# Patient Record
Sex: MALE | Race: WHITE | NOT HISPANIC OR LATINO | URBAN - METROPOLITAN AREA
[De-identification: names, ages, dates, MRNs, and addresses within clinical notes are randomized per-mention and may not be internally consistent; named-entity substitution may affect disease eponyms.]

---

## 2022-01-05 ENCOUNTER — LAB (OUTPATIENT)
Dept: LAB | Facility: HOSPITAL | Age: 62
End: 2022-01-05

## 2022-01-05 ENCOUNTER — TRANSCRIBE ORDERS (OUTPATIENT)
Dept: LAB | Facility: HOSPITAL | Age: 62
End: 2022-01-05

## 2022-01-05 DIAGNOSIS — Z01.818 PRE-OP TESTING: Primary | ICD-10-CM

## 2022-01-05 DIAGNOSIS — Z01.818 PRE-OP TESTING: ICD-10-CM

## 2022-01-05 PROCEDURE — U0004 COV-19 TEST NON-CDC HGH THRU: HCPCS

## 2022-01-05 PROCEDURE — C9803 HOPD COVID-19 SPEC COLLECT: HCPCS

## 2022-01-06 LAB — SARS-COV-2 RNA PNL SPEC NAA+PROBE: NOT DETECTED

## 2023-03-29 ENCOUNTER — TELEPHONE (OUTPATIENT)
Dept: FAMILY MEDICINE CLINIC | Facility: CLINIC | Age: 63
End: 2023-03-29
Payer: OTHER GOVERNMENT

## 2023-03-29 NOTE — TELEPHONE ENCOUNTER
Called patient's wife and notified her that [facility] is no longer accepting new patients.  Patient is already scheduled to see Nishi Albright on 4/26/23 to establish care.

## 2023-03-29 NOTE — TELEPHONE ENCOUNTER
Caller: JAYLENE PIPER    Relationship to patient: Spouse    Best call back number: [phone]    Chief complaint: SECOND OPINION ON LUNG CT    Type of visit: NEW PATIENT    Requested date: ASAP     Additional notes: PATIENT'S WIFE SEES AARON PRECIADO AND WOULD LIKE TO SET UP AN APPOINTMENT. THEY WERE ADVISED THAT PATIENT HAS A LUMP ON  ONE OF HIS LUNGS AND WOULD LIKE A SECOND OPINION.

## 2023-04-26 ENCOUNTER — LAB (OUTPATIENT)
Dept: LAB | Facility: HOSPITAL | Age: 63
End: 2023-04-26
Payer: OTHER GOVERNMENT

## 2023-04-26 ENCOUNTER — OFFICE VISIT (OUTPATIENT)
Dept: FAMILY MEDICINE CLINIC | Facility: CLINIC | Age: 63
End: 2023-04-26
Payer: OTHER GOVERNMENT

## 2023-04-26 VITALS
DIASTOLIC BLOOD PRESSURE: 71 MMHG | OXYGEN SATURATION: 100 % | HEART RATE: 46 BPM | SYSTOLIC BLOOD PRESSURE: 130 MMHG | BODY MASS INDEX: 29.23 KG/M2 | WEIGHT: 204.2 LBS | HEIGHT: 70 IN

## 2023-04-26 DIAGNOSIS — Z77.090 H/O ASBESTOS EXPOSURE: ICD-10-CM

## 2023-04-26 DIAGNOSIS — I25.10 CORONARY ARTERY CALCIFICATION: ICD-10-CM

## 2023-04-26 DIAGNOSIS — I25.84 CORONARY ARTERY CALCIFICATION: ICD-10-CM

## 2023-04-26 DIAGNOSIS — Z87.891 HISTORY OF CIGARETTE SMOKING: ICD-10-CM

## 2023-04-26 DIAGNOSIS — E04.9 THYROID ENLARGEMENT: ICD-10-CM

## 2023-04-26 DIAGNOSIS — E04.9 THYROID ENLARGEMENT: Primary | ICD-10-CM

## 2023-04-26 DIAGNOSIS — R91.1 LUNG NODULE: ICD-10-CM

## 2023-04-26 LAB
T4 FREE SERPL-MCNC: 1.23 NG/DL (ref 0.93–1.7)
TSH SERPL DL<=0.05 MIU/L-ACNC: 0.56 UIU/ML (ref 0.27–4.2)

## 2023-04-26 PROCEDURE — 99204 OFFICE O/P NEW MOD 45 MIN: CPT | Performed by: NURSE PRACTITIONER

## 2023-04-26 PROCEDURE — 36415 COLL VENOUS BLD VENIPUNCTURE: CPT

## 2023-04-26 PROCEDURE — 84439 ASSAY OF FREE THYROXINE: CPT

## 2023-04-26 PROCEDURE — 86376 MICROSOMAL ANTIBODY EACH: CPT

## 2023-04-26 PROCEDURE — 86800 THYROGLOBULIN ANTIBODY: CPT

## 2023-04-26 PROCEDURE — 84443 ASSAY THYROID STIM HORMONE: CPT

## 2023-04-26 RX ORDER — ROSUVASTATIN CALCIUM 20 MG/1
20 TABLET, COATED ORAL DAILY
COMMUNITY

## 2023-04-26 RX ORDER — CELECOXIB 200 MG/1
200 CAPSULE ORAL DAILY
COMMUNITY

## 2023-04-26 RX ORDER — MULTIPLE VITAMINS W/ MINERALS TAB 9MG-400MCG
1 TAB ORAL DAILY
COMMUNITY

## 2023-04-26 RX ORDER — DOCUSATE SODIUM 250 MG
250 CAPSULE ORAL DAILY
COMMUNITY

## 2023-04-26 RX ORDER — FINASTERIDE 5 MG/1
5 TABLET, FILM COATED ORAL DAILY
COMMUNITY

## 2023-04-26 RX ORDER — TAMSULOSIN HYDROCHLORIDE 0.4 MG/1
1 CAPSULE ORAL DAILY
COMMUNITY

## 2023-04-26 NOTE — PROGRESS NOTES
"Chief Complaint  Establish Care and Lung spot (Patient has spot on lung, seen on CT scan, wants second opinion )    SUBJECTIVE  Jay uMñiz presents to [facility]   Patient here today requesting a second opinion on his lung nodule.  Patient had a x-ray of his shoulder for shoulder pain in January 2021 which noted a \"nodular opacity in the right lower lobe best seen on lateral view\" and recommended further eval with CT of the chest.  They report that this was not reported to them and no follow-up was done, they found this incidentally while looking through his records recently due to history of the asbestos exposure, his provider ordered a CT with IV contrast, however they report that when they went for the imaging radiology change it to CT of chest without contrast.  CT noted a few punctate calcified granulomas, a 2 mm right middle lobe nodule, likely a calcified granuloma per report.  Other notable findings included mild coronary artery calcifications as well as thyroid lobes appearing enlarged without discrete nodule.      Pt has hx asbestos exposure [id number] in [employer]. Pt also smoked about 1&1/2ppd about 30 or more years, has been smoke-free for 17 years.     Patient and his wife are very concerned about the fact that the CT was done without contrast and they are wanting to seek a second opinion.  Patient does not have any symptoms such as chest pain, shortness of breath, cough, unintentional weight loss.    Patient reports he has lost about 40 pounds but this was very much intentional through significant diet and exercise    History of Present Illness  Past Medical History:   Diagnosis Date   • Arthritis 2006   • Carpal tunnel syndrome     both hands      Family History   Problem Relation Age of Onset   • Arthritis Mother    • Asthma Mother    • Arthritis Father    • Cancer Father    • Diabetes Father       Past Surgical History:   Procedure Laterality " "Date   • EYE SURGERY  Sept/Oct 2020   • ROTATOR CUFF REPAIR Left    • SHOULDER SURGERY Right         Current Outpatient Medications:   •  ascorbic acid (VITAMIN C) 250 MG tablet, Take 1 tablet by mouth Daily., Disp: , Rfl:   •  celecoxib (CeleBREX) 200 MG capsule, Take 1 capsule by mouth Daily., Disp: , Rfl:   •  cholecalciferol (VITAMIN D3) 25 MCG (1000 UT) tablet, Take 1 tablet by mouth Daily., Disp: , Rfl:   •  docusate sodium (COLACE) 250 MG capsule, Take 1 capsule by mouth Daily., Disp: , Rfl:   •  finasteride (PROSCAR) 5 MG tablet, Take 1 tablet by mouth Daily., Disp: , Rfl:   •  folic acid-vit B6-vit B12 (FOLGARD) 2.2-25-1 MG tablet tablet, Take  by mouth., Disp: , Rfl:   •  multivitamin with minerals tablet tablet, Take 1 tablet by mouth Daily., Disp: , Rfl:   •  rosuvastatin (CRESTOR) 20 MG tablet, Take 1 tablet by mouth Daily., Disp: , Rfl:   •  tamsulosin (FLOMAX) 0.4 MG capsule 24 hr capsule, Take 1 capsule by mouth Daily., Disp: , Rfl:   •  Biotin 300 MCG tablet, Take  by mouth. (Patient not taking: Reported on 4/26/2023), Disp: , Rfl:     OBJECTIVE  Vital Signs:   /71 (BP Location: Right arm, Patient Position: Sitting, Cuff Size: Adult)   Pulse (!) 46   Ht 177.8 cm (70\")   Wt 92.6 kg (204 lb 3.2 oz)   SpO2 100%   BMI 29.30 kg/m²    Estimated body mass index is 29.3 kg/m² as calculated from the following:    Height as of this encounter: 177.8 cm (70\").    Weight as of this encounter: 92.6 kg (204 lb 3.2 oz).     Wt Readings from Last 3 Encounters:   04/26/23 92.6 kg (204 lb 3.2 oz)     BP Readings from Last 3 Encounters:   04/26/23 130/71       Physical Exam  Vitals reviewed.   Constitutional:       Appearance: Normal appearance. He is well-developed.   HENT:      Head: Normocephalic and atraumatic.      Right Ear: External ear normal.      Left Ear: External ear normal.   Eyes:      Conjunctiva/sclera: Conjunctivae normal.      Pupils: Pupils are equal, round, and reactive to light.   Neck: "      Comments: Thyroid feels somewhat enlarged   Cardiovascular:      Rate and Rhythm: Normal rate and regular rhythm.      Heart sounds: No murmur heard.    No friction rub. No gallop.   Pulmonary:      Effort: Pulmonary effort is normal.      Breath sounds: Normal breath sounds. No wheezing or rhonchi.   Skin:     General: Skin is warm and dry.   Neurological:      Mental Status: He is alert and oriented to person, place, and time.      Cranial Nerves: No cranial nerve deficit.   Psychiatric:         Mood and Affect: Mood and affect normal.         Behavior: Behavior normal.         Thought Content: Thought content normal.         Judgment: Judgment normal.          Result Review        No Images in the past 120 days found..     The above data has been reviewed by AARON Niño 04/26/2023 10:55 EDT.          Patient Care Team:  Nishi Albright APRN as PCP - General (Nurse Practitioner)    BMI cannot be calculated due to outdated height or weight values.  Please input a current height/weight in Vitals and re-renter BMIFOLLOWUP in Note to pull in correct documentation based on BMI range.       ASSESSMENT & PLAN    Diagnoses and all orders for this visit:    1. Thyroid enlargement (Primary)  -     TSH+Free T4; Future  -     US Head Neck Soft Tissue; Future  -     Thyroid Antibodies; Future    2. H/O asbestos exposure  -     Ambulatory Referral to Pulmonology    3. History of cigarette smoking  -     Ambulatory Referral to Pulmonology    4. Lung nodule  -     Ambulatory Referral to Pulmonology    5. Coronary artery calcification  Assessment & Plan:  CT of chest noted to be positive for mild coronary artery calcifications.  Discussed this with patient and his wife at length, discussed possible neck steps for evaluation would include referral to cardiology, discussed possibility of stress test, discussed possibility of CT calcium scoring.  Patient declines any and all further follow-up at this time, denies  any chest pain or shortness of breath, will let us know if he changes his mind         Tobacco Use: Medium Risk   • Smoking Tobacco Use: Former   • Smokeless Tobacco Use: Never   • Passive Exposure: Not on file       Follow Up     Return if symptoms worsen or fail to improve.        Patient was given instructions and counseling regarding his condition or for health maintenance advice. Please see specific information pulled into the AVS if appropriate.   I have reviewed information obtained and documented by others and I have confirmed the accuracy of this documented note.    AARON Niño

## 2023-04-26 NOTE — ASSESSMENT & PLAN NOTE
CT of chest noted to be positive for mild coronary artery calcifications.  Discussed this with patient and his wife at length, discussed possible neck steps for evaluation would include referral to cardiology, discussed possibility of stress test, discussed possibility of CT calcium scoring.  Patient declines any and all further follow-up at this time, denies any chest pain or shortness of breath, will let us know if he changes his mind

## 2023-04-28 LAB
THYROGLOB AB SERPL-ACNC: <1 IU/ML (ref 0–0.9)
THYROPEROXIDASE AB SERPL-ACNC: 16 IU/ML (ref 0–34)

## 2023-05-31 ENCOUNTER — HOSPITAL ENCOUNTER (OUTPATIENT)
Dept: ULTRASOUND IMAGING | Facility: HOSPITAL | Age: 63
Discharge: HOME | End: 2023-05-31
Admitting: NURSE PRACTITIONER

## 2023-05-31 DIAGNOSIS — E04.9 THYROID ENLARGEMENT: ICD-10-CM

## 2023-05-31 PROCEDURE — 76536 US EXAM OF HEAD AND NECK: CPT

## 2023-06-01 DIAGNOSIS — E04.1 THYROID NODULE: Primary | ICD-10-CM

## 2023-06-12 ENCOUNTER — OFFICE VISIT (OUTPATIENT)
Dept: PULMONOLOGY | Facility: CLINIC | Age: 63
End: 2023-06-12
Payer: OTHER GOVERNMENT

## 2023-06-12 VITALS
RESPIRATION RATE: 18 BRPM | OXYGEN SATURATION: 99 % | DIASTOLIC BLOOD PRESSURE: 63 MMHG | HEART RATE: 46 BPM | WEIGHT: 202 LBS | TEMPERATURE: 97.8 F | SYSTOLIC BLOOD PRESSURE: 122 MMHG | HEIGHT: 70 IN | BODY MASS INDEX: 28.92 KG/M2

## 2023-06-12 DIAGNOSIS — R93.89 ABNORMAL CHEST CT: ICD-10-CM

## 2023-06-12 DIAGNOSIS — Z23 NEED FOR VACCINATION WITH 20-POLYVALENT PNEUMOCOCCAL CONJUGATE VACCINE: Primary | ICD-10-CM

## 2023-06-12 DIAGNOSIS — R91.1 LUNG NODULE: ICD-10-CM

## 2023-06-12 PROCEDURE — 99204 OFFICE O/P NEW MOD 45 MIN: CPT | Performed by: STUDENT IN AN ORGANIZED HEALTH CARE EDUCATION/TRAINING PROGRAM

## 2023-06-12 PROCEDURE — 90471 IMMUNIZATION ADMIN: CPT | Performed by: STUDENT IN AN ORGANIZED HEALTH CARE EDUCATION/TRAINING PROGRAM

## 2023-06-12 PROCEDURE — 90677 PCV20 VACCINE IM: CPT | Performed by: STUDENT IN AN ORGANIZED HEALTH CARE EDUCATION/TRAINING PROGRAM

## 2023-06-12 NOTE — PROGRESS NOTES
Primary Care Provider  Nishi Albright APRN   Referring Provider  AARON Bustamante      Patient Complaint  2mm RML nodule and Establish Care      Subjective          Jay Muñiz presents to [facility]      History of Presenting Illness  Jay Muñiz is a 62 y.o. male here as a new patient for abnormal chest CT.    [facility].  Patient brought the CD with him today however, I am unable to see the CT and lung window.  There is also no report attached in the CD. Patient denies respiratory symptoms at this time including cough, fever, chills, hemoptysis, weight loss, night sweats.  He was previously a heavy smoker, 1.5 pack/day for years and quit in 2006.  He reports previous asbestos exposure back when he was in the [employer] [id number].  He does not use inhalers at this time.  Of note he had COVID twice in the last 3 years.  No other exposures including dust, chemicals, animals.  I have personally reviewed patient's past family, social, medical and surgical histories and agree with their findings.    Review of Systems  Constitutional:  No fever. No chills. No weakness.  Eyes: No pain, erythema, or discharge. No blurring of vision.  ENT:  No sore throat, URI symptoms.   Cardiovascular:  No chest pain. No palpitations. No lower extremity edema.  Respiratory:  No shortness of breath, cough, pleuritic chest pain. No hemoptysis. No dyspnea.   GI:  Normal appetite. No nausea, vomiting, diarrhea. No pain. No melena.  Musculoskeletal:  No arthralgias or myalgias.  Neurologic:  No headache. No weakness.    Family History   Problem Relation Age of Onset    Arthritis Mother     Asthma Mother     Arthritis Father     Cancer Father     Diabetes Father         Social History     Socioeconomic History    Marital status:    Tobacco Use    Smoking status: Former     Packs/day: 1.50     Years: 30.00     Pack  "years: 45.00     Types: Cigarettes     Quit date: 2006     Years since quittin.6    Smokeless tobacco: Never   Vaping Use    Vaping Use: Never used   Substance and Sexual Activity    Alcohol use: Not Currently     Comment: Social Drinker, haven't drank in years    Drug use: Never    Sexual activity: Not Currently     Partners: Female        Past Medical History:   Diagnosis Date    Arthritis 2006    Carpal tunnel syndrome     both hands    Lung nodule 2021    Sleep apnea, obstructive         Immunization History   Administered Date(s) Administered    COVID-19 (Outski) 2021, 10/29/2021    FluLaval/Fluzone >6mos 10/18/2022    Flublok 18+yrs 10/23/2019, 10/20/2020    Shingrix 2022, 2023       No Known Allergies       Current Outpatient Medications:     ascorbic acid (VITAMIN C) 250 MG tablet, Take 1 tablet by mouth Daily., Disp: , Rfl:     Biotin 300 MCG tablet, Take  by mouth., Disp: , Rfl:     celecoxib (CeleBREX) 200 MG capsule, Take 1 capsule by mouth Daily., Disp: , Rfl:     cholecalciferol (VITAMIN D3) 25 MCG (1000 UT) tablet, Take 1 tablet by mouth Daily., Disp: , Rfl:     docusate sodium (COLACE) 250 MG capsule, Take 1 capsule by mouth Daily., Disp: , Rfl:     finasteride (PROSCAR) 5 MG tablet, Take 1 tablet by mouth Daily., Disp: , Rfl:     folic acid-vit B6-vit B12 (FOLGARD) 2.2-25-1 MG tablet tablet, Take  by mouth., Disp: , Rfl:     multivitamin with minerals tablet tablet, Take 1 tablet by mouth Daily., Disp: , Rfl:     rosuvastatin (CRESTOR) 20 MG tablet, Take 1 tablet by mouth Daily., Disp: , Rfl:     tamsulosin (FLOMAX) 0.4 MG capsule 24 hr capsule, Take 1 capsule by mouth Daily., Disp: , Rfl:      Objective     Vital Signs:   /63 (BP Location: Right arm, Patient Position: Sitting, Cuff Size: Adult)   Pulse (!) 46   Temp 97.8 °F (36.6 °C) (Temporal)   Resp 18   Ht 177.8 cm (70\")   Wt 91.6 kg (202 lb)   SpO2 99% Comment: Room air  BMI 28.98 kg/m²     Physical " Exam  Vitals:    06/12/23 1106   BP: 122/63   Pulse: (!) 46   Resp: 18   Temp: 97.8 °F (36.6 °C)   SpO2: 99%       General: Alert, NAD  HEENT:  EOMI, no sinus tenderness  Neck:  Supple, no thyromegaly,  no JVD  Lymph: no cervical, supraclavicular lymphadenopathy bilaterally  Chest:  clear to auscultation bilaterally, no wheezing or crackles; no work of breathing noted  CV: RRR, no M/G/R, pulses 2+, equal.  Abd:  Soft, NT, ND, +BS  EXT:  no clubbing, no cyanosis, no edema b/l  Neuro:  A&Ox3, CN grossly intact, no focal deficits  Skin: No rashes or lesions noted       Result Review :   I have personally reviewed patient's labs and images.          Assessment and Plan      Patient Active Problem List   Diagnosis    Coronary artery calcification       Impression and Plan:    Lung nodule  Abnormal chest CT  History of COVID x2  Previous tobacco user, quit 2006 (1.5 pack/day previously)    -2 mm [facility].  I am unable to view this image.  I informed him and his wife to bring the report to me for documentation purposes.  Patient is asymptomatic from a respiratory standpoint.  Previous tobacco user, quit 2006.    -Will repeat chest CT 05/2024 (ordered); follow-up thereafter    Vaccination status: Patient is up-to-date with his COVID and influenza vaccinations. Will receive his pneumococcal vaccination today    Medications personally reviewed.    Follow Up   No follow-ups on file.  Patient was given instructions and counseling regarding his condition or for health maintenance advice. Please see specific information pulled into the AVS if appropriate.

## 2023-08-14 ENCOUNTER — HOSPITAL ENCOUNTER (OUTPATIENT)
Dept: MRI IMAGING | Facility: HOSPITAL | Age: 63
Discharge: HOME | End: 2023-08-14
Admitting: ORTHOPAEDIC SURGERY
Payer: OTHER GOVERNMENT

## 2023-08-14 DIAGNOSIS — M17.11 PRIMARY OSTEOARTHRITIS OF RIGHT KNEE: ICD-10-CM

## 2023-08-14 DIAGNOSIS — S83.241A ACUTE MEDIAL MENISCUS TEAR OF RIGHT KNEE, INITIAL ENCOUNTER: ICD-10-CM

## 2023-08-14 PROCEDURE — 73721 MRI JNT OF LWR EXTRE W/O DYE: CPT

## 2023-08-18 ENCOUNTER — OFFICE VISIT (OUTPATIENT)
Dept: ORTHOPEDIC SURGERY | Facility: CLINIC | Age: 63
End: 2023-08-18
Payer: OTHER GOVERNMENT

## 2023-08-18 ENCOUNTER — PREP FOR SURGERY (OUTPATIENT)
Dept: OTHER | Facility: HOSPITAL | Age: 63
End: 2023-08-18
Payer: OTHER GOVERNMENT

## 2023-08-18 VITALS — HEIGHT: 70 IN | HEART RATE: 88 BPM | WEIGHT: 197 LBS | BODY MASS INDEX: 28.2 KG/M2 | OXYGEN SATURATION: 98 %

## 2023-08-18 DIAGNOSIS — M17.11 PRIMARY OSTEOARTHRITIS OF RIGHT KNEE: Primary | ICD-10-CM

## 2023-08-18 DIAGNOSIS — S83.241A ACUTE MEDIAL MENISCUS TEAR OF RIGHT KNEE, INITIAL ENCOUNTER: ICD-10-CM

## 2023-08-18 RX ORDER — CEFAZOLIN SODIUM 2 G/100ML
2 INJECTION, SOLUTION INTRAVENOUS ONCE
OUTPATIENT
Start: 2023-08-18 | End: 2023-08-18

## 2023-08-18 NOTE — PROGRESS NOTES
"Chief Complaint  Follow-up of the Right Knee     Subjective      Jay Muñiz presents to [facility] for follow-up of the right knee pain, follow-up of right knee MRI. He reports right knee pain since  from . He uses diclofenac gel with relief. He has tried Celebrex without relief. He takes OTC Aleve with some relief. He locates pain to the medial knee.  He reports continued pain and symptoms of the knee. No new complaints.     No Known Allergies     Social History     Socioeconomic History    Marital status:    Tobacco Use    Smoking status: Former     Packs/day: 1.50     Years: 30.00     Pack years: 45.00     Types: Cigarettes     Quit date: 2006     Years since quittin.7    Smokeless tobacco: Never   Vaping Use    Vaping Use: Never used   Substance and Sexual Activity    Alcohol use: Not Currently     Comment: Social Drinker, haven't drank in years    Drug use: Never    Sexual activity: Not Currently     Partners: Female        I reviewed the patient's chief complaint, history of present illness, review of systems, past medical history, surgical history, family history, social history, medications, and allergy list.     Review of Systems     Constitutional: Denies fevers, chills, weight loss  Cardiovascular: Denies chest pain, shortness of breath  Skin: Denies rashes, acute skin changes  Neurologic: Denies headache, loss of consciousness  MSK: right knee      Vital Signs:   Pulse 88   Ht 177.8 cm (70\")   Wt 89.4 kg (197 lb)   SpO2 98%   BMI 28.27 kg/mý          Physical Exam  General: Alert. No acute distress    Ortho Exam         Right knee- tender to the medial joint line. Tender to the medial distal femur. ROM +3 to 125 degrees. Stable to varus/valgus stress. Stable to anterior/posterior drawer. Well healed anterior knee scar. Negative Lachman's. Pain with Cosmo's. Positive EHL, FHL, GS and TA. Sensation intact to all 5 nerves of the foot. " Positive pulses.     Procedures        Imaging Results (Most Recent)       None             Result Review :       MRI Knee Right Without Contrast    Result Date: 8/14/2023  Narrative: PROCEDURE: MRI KNEE RIGHT  WO CONTRAST  COMPARISON: None  INDICATIONS: MEDIAL RIGHT KNEE PAIN WITH OFF/ON SWELLING. NO KNOWN INJURY.      TECHNIQUE: A complete multi-planar MRI was performed.   FINDINGS:  The cruciate ligaments, collateral ligaments, and extensor mechanism of the knee are intact.  There is a complex tear involving the posterior horn and body segment of the medial meniscus.  There is a horizontal component which extends through the inferior articular surface of the body segment and posterior horn.  There is a slightly shifted fragment which extends into the inferior margin of the medial gutter along the undersurface of the body segment measuring approximately 1 cm.  There is also involvement of the root of the posterior horn.  A small parameniscal cyst is seen along the posterior margin of the root measuring approximately 1 cm.  There is an additional parameniscal cyst which extends from the posterior medial margin of the meniscus into the soft tissues along the posterior medial aspect of the medial femoral condyle superiorly.  This finding measures up to 3 cm.  There is no evidence for lateral meniscal tear.  Mild tricompartmental osteoarthritic degenerative changes are identified with evidence for articular cartilage irregularity/fissuring, subchondral edema/cystic change, and osteophytosis.  There is mild full-thickness articular cartilage loss within the medial compartment.  No significant joint effusion is observed. No significant focal abnormal bone marrow signal is identified. The cortical margins are intact. No significant abnormal focal fluid collection is observed within the surrounding soft tissues.      Impression:   1. There is a complex meniscal tear involving the posterior horn and body segment of the  medial meniscus.  There is evidence for a small flipped or displaced fragment which extends into the inferior margin of the medial gutter along the undersurface of the body segment measuring 1 cm.  There is also evidence for 2 separate associated parameniscal cysts arising from the posterior margin of the meniscus. 2. Mild tricompartmental osteoarthritis.  Mild full-thickness articular cartilage loss is noted in the medial compartment.       ALISA TRUJILLO MD       Electronically Signed and Approved By: ALISA TRUJILLO MD on 8/14/2023 at 13:26                     Assessment and Plan     Diagnoses and all orders for this visit:    1. Primary osteoarthritis of right knee (Primary)    2. Acute medial meniscus tear of right knee        Discussed the MRI with the patient and discussed the way his knee has possibly progressed overtime to cause the tear. We discussed the options with the patient. He tried a steroid injection with relief for a brief time. He has also tried anti-inflammatories. We discussed operative treatment with patient and he expressed understanding. He wished to proceed with surgery and will choose a time in November.     Discussed surgery., Risks/benefits discussed with patient including, but not limited to: infection, bleeding, neurovascular damage, re-rupture, aesthetic deformity, need for further surgery, and death., Risks/benefits discussed with patient including, but not limited to: infection, bleeding, neurovascular damage, malunion, nonunion, aesthetic deformity, need for further surgery, and death., Discussed with patient the implant type being used during surgery and patient understands., Surgery pamphlet given., Call or return if worsening symptoms., DME order for a 3 in 1 given today due to patient will be confined to one room/level of the home that does not offer a toilet during postop recovery. , I am requesting authorization for the samr System to reduce the patient's pain and aid in their  recovery. I believe the samr Sport System will have positive impact on my patient's quality of life. I would appreciate prompt review of the information and authorization of the samr System.  samr Sport utilizes ultrasonic waves that penetrate 5 cm into the tissue, which increases circulation, oxygen and nutrient delivery, and the removal of waste products, such as lactic acid, from the site of the musculoskeletal injury.  The samr System is a new FDA approved (FDA 510K 163625) treatment modality that that has been shown in recent clinical trials sponsored by the NIH (National Institutes of Health), the DOD (Department of Defense) and [facility] the research arm of the [employer] ([employer]ics [employer]) to reduce patient's pain, increase mobility and speed recovery. , and I am ordering the use of the Nice1 cold therapy machine for 60 days post-op as part of an opioid-sparing approach to help manage pain and edema.  I feel this is medically necessary for the best care for this patient.       Follow Up     2 weeks postoperative      Patient was given instructions and counseling regarding his condition or for health maintenance advice. Please see specific information pulled into the AVS if appropriate.     Scribed for Washington Bishop MD by Angela Shen.  08/18/23   08:18 EDT    I have personally performed the services described in this document as scribed by the above individual and it is both accurate and complete. Washington Bishop MD 08/20/23

## 2023-11-12 NOTE — H&P
[facility]   HISTORY AND PHYSICAL    Patient Name: Jay Muñiz  : 1960  MRN: [id number]  Primary Care Physician:  Nishi Albright APRN  Date of admission: (Not on file)    Subjective   Subjective     Chief Complaint: Right knee pain    History of Present Illness: The patient is a 62-year-old male with right knee pain.  The patient has symptoms worsening despite attempted nonoperative treatment.  There is pain and disability to the knee that is worse with activity.  The patient has failed nonoperative treatment and wishes to undergo arthroscopy.    Review of Systems : Negative except for those mentioned in the history of present illness    Personal History     Past Medical History:   Diagnosis Date    Arthritis     JOEY KNEE    Carpal tunnel syndrome     both hands    Lung nodule 2021    Sleep apnea, obstructive     LOST WEIGHT NO LONGER USES MACHINE       Past Surgical History:   Procedure Laterality Date    CARPAL TUNNEL RELEASE      ELBOW PROCEDURE      EYE SURGERY  Sept/Oct 2020    ROTATOR CUFF REPAIR Left     SHOULDER SURGERY Right        Family History: family history includes Arthritis in his father and mother; Asthma in his mother; Cancer in his father; Diabetes in his father. Otherwise pertinent FHx was reviewed and not pertinent to current issue.    Social History:  reports that he quit smoking about 17 years ago. His smoking use included cigarettes. He has a 45.00 pack-year smoking history. He has never used smokeless tobacco. He reports that he does not currently use alcohol. He reports that he does not use drugs.    Home Medications:  Biotin, Diclofenac Sodium, Turmeric, ascorbic acid, cholecalciferol, finasteride, folic acid-vit B6-vit B12, multivitamin with minerals, rosuvastatin, and tamsulosin    Allergies:  No Known Allergies    Objective    Objective     Vitals:        Physical Exam  General: No apparent distress, alert and oriented x3  HEENT:  Normocephalic/atraumatic  Neck: Supple  Cardiovascular: Regular heart rate  Chest: Unlabored breathing  Abdomen: Soft, nontender and nondistended  Musculoskeletal: Neurovascular intact extremity.  Positive pulses.  Tender to palpation medial joint line.  Positive Cosmo.  Stability intact.  Tender to palpation the knee.  Neurological: Grossly intact    Result Review    Result Review:  I have personally reviewed the results from the time of this admission to 11/12/2023 16:31 EST and agree with these findings:  []  Laboratory list / accordion  []  Microbiology  [x]  Radiology  []  EKG/Telemetry   []  Cardiology/Vascular   []  Pathology  []  Old records  []  Other:  Most notable findings include: MRI reveals medial meniscus tear      Assessment & Plan   Assessment / Plan     Brief Patient Summary:  Jay Muñiz is a 62 y.o. male who has right knee medial meniscus tear    Active Hospital Problems:  Active Hospital Problems    Diagnosis     **Acute medial meniscus tear of right knee      Plan:   I discussed treatment options with the patient.  Operative versus nonoperative treatment was discussed.  Risks and benefits of surgery were discussed.  Informed consent was obtained and the patient wishes to proceed with operative treatment with knee arthroscopy and partial meniscectomy.    DVT prophylaxis:  No DVT prophylaxis order currently exists.    CODE STATUS:       Admission Status:  I believe this patient meets outpatient status.    Washington Bishop MD

## 2023-11-13 ENCOUNTER — ANESTHESIA (OUTPATIENT)
Dept: PERIOP | Facility: HOSPITAL | Age: 63
End: 2023-11-13
Payer: OTHER GOVERNMENT

## 2023-11-13 ENCOUNTER — ANESTHESIA EVENT (OUTPATIENT)
Dept: PERIOP | Facility: HOSPITAL | Age: 63
End: 2023-11-13
Payer: OTHER GOVERNMENT

## 2023-11-13 ENCOUNTER — HOSPITAL ENCOUNTER (OUTPATIENT)
Facility: HOSPITAL | Age: 63
Setting detail: HOSPITAL OUTPATIENT SURGERY
Discharge: HOME | End: 2023-11-13
Attending: ORTHOPAEDIC SURGERY | Admitting: ORTHOPAEDIC SURGERY
Payer: OTHER GOVERNMENT

## 2023-11-13 VITALS
RESPIRATION RATE: 16 BRPM | DIASTOLIC BLOOD PRESSURE: 75 MMHG | OXYGEN SATURATION: 99 % | BODY MASS INDEX: 29.51 KG/M2 | HEART RATE: 50 BPM | SYSTOLIC BLOOD PRESSURE: 112 MMHG | HEIGHT: 70 IN | TEMPERATURE: 97 F | WEIGHT: 206.13 LBS

## 2023-11-13 DIAGNOSIS — S83.241A ACUTE MEDIAL MENISCUS TEAR OF RIGHT KNEE, INITIAL ENCOUNTER: ICD-10-CM

## 2023-11-13 PROCEDURE — 25010000002 DEXAMETHASONE PER 1 MG: Performed by: MARRIAGE & FAMILY THERAPIST

## 2023-11-13 PROCEDURE — 93005 ELECTROCARDIOGRAM TRACING: CPT | Performed by: ANESTHESIOLOGY

## 2023-11-13 PROCEDURE — 29881 ARTHRS KNE SRG MNISECTMY M/L: CPT | Performed by: ORTHOPAEDIC SURGERY

## 2023-11-13 PROCEDURE — 25010000002 PROPOFOL 10 MG/ML EMULSION: Performed by: MARRIAGE & FAMILY THERAPIST

## 2023-11-13 PROCEDURE — 25010000002 MORPHINE SULFATE 10 MG/ML SOLUTION: Performed by: ORTHOPAEDIC SURGERY

## 2023-11-13 PROCEDURE — 25010000002 CEFAZOLIN IN DEXTROSE 2000 MG/ 100 ML SOLUTION: Performed by: ORTHOPAEDIC SURGERY

## 2023-11-13 PROCEDURE — 25010000002 ONDANSETRON PER 1 MG: Performed by: MARRIAGE & FAMILY THERAPIST

## 2023-11-13 PROCEDURE — 25010000002 FENTANYL CITRATE (PF) 50 MCG/ML SOLUTION: Performed by: MARRIAGE & FAMILY THERAPIST

## 2023-11-13 PROCEDURE — 25010000002 MIDAZOLAM PER 1MG: Performed by: ANESTHESIOLOGY

## 2023-11-13 PROCEDURE — 25810000003 LACTATED RINGERS PER 1000 ML: Performed by: ANESTHESIOLOGY

## 2023-11-13 PROCEDURE — 25010000002 KETOROLAC TROMETHAMINE PER 15 MG: Performed by: MARRIAGE & FAMILY THERAPIST

## 2023-11-13 PROCEDURE — 25010000002 BUPIVACAINE (PF) 0.5 % SOLUTION: Performed by: ORTHOPAEDIC SURGERY

## 2023-11-13 RX ORDER — OXYCODONE HYDROCHLORIDE 5 MG/1
5 TABLET ORAL
Status: DISCONTINUED | OUTPATIENT
Start: 2023-11-13 | End: 2023-11-13 | Stop reason: SDUPTHER

## 2023-11-13 RX ORDER — PROMETHAZINE HYDROCHLORIDE 12.5 MG/1
25 TABLET ORAL ONCE AS NEEDED
Status: DISCONTINUED | OUTPATIENT
Start: 2023-11-13 | End: 2023-11-13 | Stop reason: HOSPADM

## 2023-11-13 RX ORDER — ACETAMINOPHEN 500 MG
1000 TABLET ORAL ONCE
Status: COMPLETED | OUTPATIENT
Start: 2023-11-13 | End: 2023-11-13

## 2023-11-13 RX ORDER — OXYCODONE HYDROCHLORIDE 5 MG/1
5 TABLET ORAL
Status: DISCONTINUED | OUTPATIENT
Start: 2023-11-13 | End: 2023-11-13 | Stop reason: HOSPADM

## 2023-11-13 RX ORDER — CEFAZOLIN SODIUM 2 G/100ML
2 INJECTION, SOLUTION INTRAVENOUS ONCE
Status: COMPLETED | OUTPATIENT
Start: 2023-11-13 | End: 2023-11-13

## 2023-11-13 RX ORDER — LIDOCAINE HYDROCHLORIDE 20 MG/ML
INJECTION, SOLUTION EPIDURAL; INFILTRATION; INTRACAUDAL; PERINEURAL AS NEEDED
Status: DISCONTINUED | OUTPATIENT
Start: 2023-11-13 | End: 2023-11-13 | Stop reason: SURG

## 2023-11-13 RX ORDER — PROPOFOL 10 MG/ML
VIAL (ML) INTRAVENOUS AS NEEDED
Status: DISCONTINUED | OUTPATIENT
Start: 2023-11-13 | End: 2023-11-13 | Stop reason: SURG

## 2023-11-13 RX ORDER — ONDANSETRON 2 MG/ML
4 INJECTION INTRAMUSCULAR; INTRAVENOUS ONCE AS NEEDED
Status: DISCONTINUED | OUTPATIENT
Start: 2023-11-13 | End: 2023-11-13 | Stop reason: HOSPADM

## 2023-11-13 RX ORDER — HYDROCODONE BITARTRATE AND ACETAMINOPHEN 7.5; 325 MG/1; MG/1
1 TABLET ORAL EVERY 4 HOURS PRN
Qty: 30 TABLET | Refills: 0 | Status: SHIPPED | OUTPATIENT
Start: 2023-11-13

## 2023-11-13 RX ORDER — GLYCOPYRROLATE 0.2 MG/ML
INJECTION INTRAMUSCULAR; INTRAVENOUS AS NEEDED
Status: DISCONTINUED | OUTPATIENT
Start: 2023-11-13 | End: 2023-11-13 | Stop reason: SURG

## 2023-11-13 RX ORDER — MIDAZOLAM HYDROCHLORIDE 2 MG/2ML
2 INJECTION, SOLUTION INTRAMUSCULAR; INTRAVENOUS ONCE
Status: COMPLETED | OUTPATIENT
Start: 2023-11-13 | End: 2023-11-13

## 2023-11-13 RX ORDER — MEPERIDINE HYDROCHLORIDE 25 MG/ML
12.5 INJECTION INTRAMUSCULAR; INTRAVENOUS; SUBCUTANEOUS
Status: DISCONTINUED | OUTPATIENT
Start: 2023-11-13 | End: 2023-11-13 | Stop reason: SDUPTHER

## 2023-11-13 RX ORDER — PROMETHAZINE HYDROCHLORIDE 25 MG/1
25 SUPPOSITORY RECTAL ONCE AS NEEDED
Status: DISCONTINUED | OUTPATIENT
Start: 2023-11-13 | End: 2023-11-13 | Stop reason: SDUPTHER

## 2023-11-13 RX ORDER — BUPIVACAINE HYDROCHLORIDE 5 MG/ML
INJECTION, SOLUTION EPIDURAL; INTRACAUDAL AS NEEDED
Status: DISCONTINUED | OUTPATIENT
Start: 2023-11-13 | End: 2023-11-13 | Stop reason: HOSPADM

## 2023-11-13 RX ORDER — PROMETHAZINE HYDROCHLORIDE 25 MG/1
25 SUPPOSITORY RECTAL ONCE AS NEEDED
Status: DISCONTINUED | OUTPATIENT
Start: 2023-11-13 | End: 2023-11-13 | Stop reason: HOSPADM

## 2023-11-13 RX ORDER — MORPHINE SULFATE 10 MG/ML
INJECTION INTRAVENOUS AS NEEDED
Status: DISCONTINUED | OUTPATIENT
Start: 2023-11-13 | End: 2023-11-13 | Stop reason: HOSPADM

## 2023-11-13 RX ORDER — ASPIRIN 325 MG
325 TABLET, DELAYED RELEASE (ENTERIC COATED) ORAL DAILY
Qty: 14 TABLET | Refills: 0 | Status: SHIPPED | OUTPATIENT
Start: 2023-11-13

## 2023-11-13 RX ORDER — FENTANYL CITRATE 50 UG/ML
INJECTION, SOLUTION INTRAMUSCULAR; INTRAVENOUS AS NEEDED
Status: DISCONTINUED | OUTPATIENT
Start: 2023-11-13 | End: 2023-11-13 | Stop reason: SURG

## 2023-11-13 RX ORDER — KETOROLAC TROMETHAMINE 30 MG/ML
INJECTION, SOLUTION INTRAMUSCULAR; INTRAVENOUS AS NEEDED
Status: DISCONTINUED | OUTPATIENT
Start: 2023-11-13 | End: 2023-11-13 | Stop reason: SURG

## 2023-11-13 RX ORDER — ONDANSETRON 2 MG/ML
INJECTION INTRAMUSCULAR; INTRAVENOUS AS NEEDED
Status: DISCONTINUED | OUTPATIENT
Start: 2023-11-13 | End: 2023-11-13 | Stop reason: SURG

## 2023-11-13 RX ORDER — DEXAMETHASONE SODIUM PHOSPHATE 4 MG/ML
INJECTION, SOLUTION INTRA-ARTICULAR; INTRALESIONAL; INTRAMUSCULAR; INTRAVENOUS; SOFT TISSUE AS NEEDED
Status: DISCONTINUED | OUTPATIENT
Start: 2023-11-13 | End: 2023-11-13 | Stop reason: SURG

## 2023-11-13 RX ORDER — ONDANSETRON 2 MG/ML
4 INJECTION INTRAMUSCULAR; INTRAVENOUS ONCE AS NEEDED
Status: DISCONTINUED | OUTPATIENT
Start: 2023-11-13 | End: 2023-11-13 | Stop reason: SDUPTHER

## 2023-11-13 RX ORDER — MEPERIDINE HYDROCHLORIDE 25 MG/ML
12.5 INJECTION INTRAMUSCULAR; INTRAVENOUS; SUBCUTANEOUS
Status: DISCONTINUED | OUTPATIENT
Start: 2023-11-13 | End: 2023-11-13 | Stop reason: HOSPADM

## 2023-11-13 RX ORDER — SODIUM CHLORIDE, SODIUM LACTATE, POTASSIUM CHLORIDE, CALCIUM CHLORIDE 600; 310; 30; 20 MG/100ML; MG/100ML; MG/100ML; MG/100ML
9 INJECTION, SOLUTION INTRAVENOUS CONTINUOUS PRN
Status: DISCONTINUED | OUTPATIENT
Start: 2023-11-13 | End: 2023-11-13 | Stop reason: HOSPADM

## 2023-11-13 RX ORDER — MAGNESIUM HYDROXIDE 1200 MG/15ML
LIQUID ORAL AS NEEDED
Status: DISCONTINUED | OUTPATIENT
Start: 2023-11-13 | End: 2023-11-13 | Stop reason: HOSPADM

## 2023-11-13 RX ORDER — PROMETHAZINE HYDROCHLORIDE 12.5 MG/1
25 TABLET ORAL ONCE AS NEEDED
Status: DISCONTINUED | OUTPATIENT
Start: 2023-11-13 | End: 2023-11-13 | Stop reason: SDUPTHER

## 2023-11-13 RX ADMIN — FENTANYL CITRATE 50 MCG: 50 INJECTION, SOLUTION INTRAMUSCULAR; INTRAVENOUS at 09:57

## 2023-11-13 RX ADMIN — SODIUM CHLORIDE, POTASSIUM CHLORIDE, SODIUM LACTATE AND CALCIUM CHLORIDE 9 ML/HR: 600; 310; 30; 20 INJECTION, SOLUTION INTRAVENOUS at 08:22

## 2023-11-13 RX ADMIN — LIDOCAINE HYDROCHLORIDE 50 MG: 20 INJECTION, SOLUTION EPIDURAL; INFILTRATION; INTRACAUDAL; PERINEURAL at 10:08

## 2023-11-13 RX ADMIN — FENTANYL CITRATE 50 MCG: 50 INJECTION, SOLUTION INTRAMUSCULAR; INTRAVENOUS at 09:39

## 2023-11-13 RX ADMIN — LIDOCAINE HYDROCHLORIDE 50 MG: 20 INJECTION, SOLUTION EPIDURAL; INFILTRATION; INTRACAUDAL; PERINEURAL at 09:39

## 2023-11-13 RX ADMIN — GLYCOPYRROLATE 0.1 MG: 0.2 INJECTION INTRAMUSCULAR; INTRAVENOUS at 09:36

## 2023-11-13 RX ADMIN — CEFAZOLIN SODIUM 2 G: 2 INJECTION, SOLUTION INTRAVENOUS at 09:44

## 2023-11-13 RX ADMIN — GLYCOPYRROLATE 0.1 MG: 0.2 INJECTION INTRAMUSCULAR; INTRAVENOUS at 09:39

## 2023-11-13 RX ADMIN — DEXAMETHASONE SODIUM PHOSPHATE 4 MG: 4 INJECTION, SOLUTION INTRAMUSCULAR; INTRAVENOUS at 09:39

## 2023-11-13 RX ADMIN — MIDAZOLAM HYDROCHLORIDE 2 MG: 1 INJECTION, SOLUTION INTRAMUSCULAR; INTRAVENOUS at 09:23

## 2023-11-13 RX ADMIN — ONDANSETRON 4 MG: 2 INJECTION INTRAMUSCULAR; INTRAVENOUS at 10:07

## 2023-11-13 RX ADMIN — KETOROLAC TROMETHAMINE 15 MG: 30 INJECTION, SOLUTION INTRAMUSCULAR; INTRAVENOUS at 10:07

## 2023-11-13 RX ADMIN — ACETAMINOPHEN 1000 MG: 500 TABLET ORAL at 08:22

## 2023-11-13 RX ADMIN — PROPOFOL 150 MG: 10 INJECTION, EMULSION INTRAVENOUS at 09:39

## 2023-11-13 NOTE — DISCHARGE INSTRUCTIONS
DISCHARGE INSTRUCTIONS  POST-OPERATIVE  Knee Arthroscopic Surgery      For your surgery you had:  General anesthesia (you may have a sore throat for the first 24 hours)    IV sedation.  Local anesthesia  Monitored anesthesia care  You may experience dizziness, drowsiness, or light-headedness for several hours following surgery/procedure.  Do not stay alone today or tonight.  Limit your activity for 24 hours.  Resume your diet slowly.  Follow whatever special dietary instructions you may have been given by your doctor.  You should not drive or operate machinery or drink alcohol for 24 hours or while you are taking pain medication.  You should not sign legally binding documents.  .  Post-Operative Day #1  Post-Operative Day #1 is counted as the 1st day after surgery.  Leave ace wrap on day one to aid in the reduction of swelling.  If dressing is too tight it can be rewrapped.  Post-Operative Day #2  Ace wrap and dressing may be removed.  Put a 4x4 dressing to suture or staple site.  Change dressing once or twice daily until suture or staples are removed.  It is normal to have some redness or irritation around skin sutures or stapes, however, if you have any expanding areas of redness with a persistent fever and increasing pain notify the physician as soon as possible.  On Post-Operative Day #2, you may want to reapply the ace wrap.  Put ace wrap directly over the knee to add compression and aid in swelling reduction.  It is normal to have some swelling down into the calf and ankle after knee arthroscopy or Anterior Cruciate Ligament (ACL) reconstruction.  If you notice a significant amount of swelling or increasing pain in calf area, notify the physician immediately.   Post-Operative Day #2  You may shower.  During shower, avoid too much water to incision site.  Let water run down leg and then pat dry.  Do not put any ointments on the incision unless directed by surgeon.  Reapply dry dressing to sutures or staple  sites.    General Information  Full weight bearing with crutches is allowed after a standard knee arthroscopy or ACL reconstruction unless instructed otherwise by surgeon.  You may put as much weight on knee as tolerable.  If given a knee immobilizer postoperatively, usually with an ACL reconstruction, this is for protection with early ambulation until you are steadier on your feet.  It is very important to take off immobilizer to do range of motion and flexion and extension exercises.  You do not have to sleep in the knee immobilizer.  Knee range of motion exercises should be started as early as the day of surgery.  Try to achieve full extension and start working on range of motion and flexion of the knee.  Move the ankle up and down periodically to help reduce swelling as well as reduce blood pooling.  To allow full extension of the knee and prevent blood clots it is very important to put pillows at the level of the mid-calf to the foot.  DO NOT put pillows directly behind knee.  SPECIAL INSTRUCTIONS:                                                             DISCHARGE INSTRUCTIONS  POST-OPERATIVE   Knee Arthroscopic Surgery      General Instructions  You will receive a prescription for physical therapy, unless otherwise instructed by physician.  Physical therapy is imperative especially after ACL reconstruction and some knee arthroscopies for swelling reduction, knee range of motion and strengthening.    [x] Use ice pack on the knee for 20 minutes on and 20 minutes off.  Never place ice directly on the skin.  By following this, you will cool the knee sufficiently.  Avoid getting dressing wet.  To help reduce swelling and minimize throbbing pain, use pillows to keep the knee elevated above the level of the heart, even while sleeping.  Sit with the leg propped up on a footstool or chair with pillows.  Exercises toes for 10 minutes every hour while awake.  If you are given a prescription for pain medication, take  it as prescribed.  If you are able to take over-the-counter anti-inflammatory medications such as Motrin or Aleve, you may take as per package instructions in between the pain medication to help enhance pain control and reduce swelling.  If you are taking the pain medication prescribed, do not take Tylenol too unless you consult with the pharmacist. Generally, the pain medications prescribed have Tylenol in them and too much Tylenol can be harmful.  You should stop the anti-inflammatory medication if you experience any stomach/GI disturbances.  Consult with your physician or your pharmacist before taking over-the-counter pain medications/anti-inflammatories. It is not uncommon to have a fever post-operatively especially in the first 24-48 hours.  Deep breathing and coughing and early ambulation will help.  Anti-inflammatories can be used for fever reduction also.  If unable to urinate 6 to 8 hours after surgery or urinating frequently in small amounts, notify the physician or go to the nearest Emergency Room.  NOTIFY YOUR PHYSICIAN IF YOU EXPERIENCE:  Numbness of toes.  Inability to move toes.  Extreme coldness, paleness or blue dis-coloration of toes.  Any pain other than from the incision, such as swelling of the leg that blocks circulation of the toes.  Follow verbal instructions from your doctor.  Medications per physician instructions as indicated on Discharge Medication Information Sheet.  You should see  ______________________for follow-up care  on   .  Phone number: _________________________  Missing your appointment/follow-up could lead to serious complications.  If you have an emergency and cannot reach your doctor, go to an Emergency Room nearest your home.

## 2023-11-13 NOTE — ANESTHESIA POSTPROCEDURE EVALUATION
Patient: Jay Muñiz    Procedure Summary       Date: 11/13/23 Room / Location: [facility] OR [address] [place] [address] OR [facility]    Anesthesia Start: 0934 Anesthesia Stop: 1019    Procedure: RIGHT KNEE ARTHROSCOPY WITH PARTIAL MEDIAL MENISCECTOMY AND CHONDROPLASTY (Right: Knee) Diagnosis:       Acute medial meniscus tear of right knee, initial encounter      (Acute medial meniscus tear of right knee, initial encounter [S83.241A])    Surgeons: Washington Bishop MD Provider: Marcy Anderson DO    Anesthesia Type: general ASA Status: 2            Anesthesia Type: general    Vitals  Vitals Value Taken Time   /76 11/13/23 1048   Temp 36.2 °C (97.2 °F) 11/13/23 1017   Pulse 49 11/13/23 1048   Resp 16 11/13/23 1048   SpO2 99 % 11/13/23 1043           Post Anesthesia Care and Evaluation    Patient location during evaluation: PACU  Patient participation: complete - patient participated  Level of consciousness: awake  Pain management: adequate    Airway patency: patent  Anesthetic complications: No anesthetic complications  PONV Status: none  Cardiovascular status: acceptable  Respiratory status: acceptable

## 2023-11-13 NOTE — ANESTHESIA PREPROCEDURE EVALUATION
Anesthesia Evaluation     Patient summary reviewed and Nursing notes reviewed   no history of anesthetic complications:   NPO Solid Status: > 8 hours  NPO Liquid Status: > 8 hours           Airway   Mallampati: II  Dental      Pulmonary    (+) ,sleep apnea (lost weight, no longer needs cpap)  Cardiovascular   Exercise tolerance: good (4-7 METS)    NYHA Classification: I    (+) CAD  (-) past MI, CHF      Neuro/Psych  (+) numbness  GI/Hepatic/Renal/Endo      Musculoskeletal     Abdominal    Substance History      OB/GYN          Other   arthritis,                 Anesthesia Plan    ASA 2     general     intravenous induction     Anesthetic plan, risks, benefits, and alternatives have been provided, discussed and informed consent has been obtained with: patient.    CODE STATUS:

## 2023-11-14 ENCOUNTER — TELEPHONE (OUTPATIENT)
Dept: FAMILY MEDICINE CLINIC | Facility: CLINIC | Age: 63
End: 2023-11-14
Payer: OTHER GOVERNMENT

## 2023-11-14 DIAGNOSIS — L91.8 SKIN TAG: Primary | ICD-10-CM

## 2023-11-14 NOTE — TELEPHONE ENCOUNTER
Caller: JAYLENE PIPER    Relationship to patient: Emergency Contact    Best call back number: [phone]     Chief complaint: SKIN TAG REMOVAL NEAR NECK AND UPPER CHEST AREA    Additional notes: PATIENT'S SPOUSE IS CALLING TO INQUIRE IF PATIENT CAN GET THIS DONE IN THE OFFICE. PLEASE CALL TO ADVISE.

## 2023-11-17 LAB
QT INTERVAL: 428 MS
QTC INTERVAL: 387 MS

## 2023-11-22 ENCOUNTER — HOSPITAL ENCOUNTER (OUTPATIENT)
Dept: ULTRASOUND IMAGING | Facility: HOSPITAL | Age: 63
Discharge: HOME | End: 2023-11-22
Admitting: NURSE PRACTITIONER
Payer: OTHER GOVERNMENT

## 2023-11-22 DIAGNOSIS — E04.1 THYROID NODULE: ICD-10-CM

## 2023-11-22 PROCEDURE — 76536 US EXAM OF HEAD AND NECK: CPT

## 2023-11-27 ENCOUNTER — OFFICE VISIT (OUTPATIENT)
Dept: ORTHOPEDIC SURGERY | Facility: CLINIC | Age: 63
End: 2023-11-27
Payer: OTHER GOVERNMENT

## 2023-11-27 VITALS
WEIGHT: 200 LBS | DIASTOLIC BLOOD PRESSURE: 67 MMHG | BODY MASS INDEX: 28.63 KG/M2 | OXYGEN SATURATION: 95 % | SYSTOLIC BLOOD PRESSURE: 117 MMHG | HEART RATE: 59 BPM | HEIGHT: 70 IN

## 2023-11-27 DIAGNOSIS — E04.1 THYROID NODULE: Primary | ICD-10-CM

## 2023-11-27 DIAGNOSIS — Z47.89 AFTERCARE FOLLOWING SURGERY OF THE MUSCULOSKELETAL SYSTEM: Primary | ICD-10-CM

## 2023-11-27 PROCEDURE — 99024 POSTOP FOLLOW-UP VISIT: CPT | Performed by: PHYSICIAN ASSISTANT

## 2023-11-27 NOTE — PROGRESS NOTES
"Chief Complaint  Pain and Follow-up of the Right Knee and Suture / Staple Removal    Subjective          History of Present Illness      Jay Muñiz is a 62 y.o. male  presents to [facility] for     Patient presents with his wife Peri for 2-week postop evaluation right knee arthroscopic partial medial meniscectomy, chondroplasty, 2023.  Patient and wife state he is doing very well after surgery.  He denies redness swelling or drainage from the incision sites.  Denies need for pain medication.  He is already finished/been released from physical therapy.  He denies calf pain.  If he has pain he points to the posterior knee.  He is ambulating well he states he did not use crutches and was able to start walking right away.  Patient and wife state he is doing very well and may be \"overdoing it \".      No Known Allergies     Social History     Socioeconomic History    Marital status:    Tobacco Use    Smoking status: Former     Packs/day: 1.50     Years: 30.00     Additional pack years: 0.00     Total pack years: 45.00     Types: Cigarettes     Quit date: 2006     Years since quittin.0    Smokeless tobacco: Never   Vaping Use    Vaping Use: Never used   Substance and Sexual Activity    Alcohol use: Not Currently     Comment: Social Drinker, haven't drank in years    Drug use: Never    Sexual activity: Not Currently     Partners: Female        REVIEW OF SYSTEMS    Constitutional: Awake alert and oriented x3, no acute distress, denies fevers, chills, weight loss  Respiratory: No respiratory distress  Vascular: Brisk cap refill, Intact distal pulses, No cyanosis, compartments soft with no signs or symptoms of compartment syndrome or DVT.   Cardiovascular: Denies chest pain, shortness of breath  Skin: Denies rashes, acute skin changes  Neurologic: Denies headache, loss of consciousness  MSK: Right knee pain      Objective   Vital Signs:   /67   Pulse 59   Ht " "177.8 cm (70\")   Wt 90.7 kg (200 lb)   SpO2 95%   BMI 28.70 kg/m²     Body mass index is 28.7 kg/m².    Physical Exam       Right knee: Incisions are healing well, no erythema, no ecchymosis, no swelling, no effusion, no signs of infection, full extension, flexion 120, stable anterior/posterior drawer, stable to varus/valgus stress.  Nontender to palpation, no pain with range of motion.  Nontender calf, negative Rima testing, patient ambulates with nonantalgic gait      Procedures    Imaging Results (Most Recent)       None             Result Review :   The following data was reviewed by: HECTOR Hernandez on 11/27/2023:               Assessment and Plan    Diagnoses and all orders for this visit:    1. Aftercare following surgery of RIGHT KNEE ARTHROSCOPY WITH PARTIAL MEDIAL MENISCECTOMY AND CHONDROPLASTY 11/13/23 (Primary)        Reviewed operative images, operative report with the patient, and his wife,.  Discussed diagnosis and treatment options.  He was advised to be careful with activities, weightbearing and activity as tolerated.  Patient and wife state they can be traveling for the next 3 to 4 months he will not be able to follow-up for another visit at 4 weeks therefore patient and wife were advised they can follow-up as needed.  Sutures/staples removed in office today. Steri-strips applied. Discussed incision care/hygiene. May shower, but do not submerge in water until fully healed.  Advised patient that if any concerning symptoms regarding incision appearance occur that they should call us right away, patient expressed understanding.    Call or return if worsening symptoms.    Follow Up   Return if symptoms worsen or fail to improve.  Patient was given instructions and counseling regarding his condition or for health maintenance advice. Please see specific information pulled into the AVS if appropriate.       EMR Dragon/Transcription disclaimer:  Much of this encounter note is an electronic " transcription/translation of spoken language to printed text, aka voice recognition.  The electronic translation of spoken language may permit erroneous or at times nonsensical words or phrases to be inadvertently transcribed; although I have reviewed the note for such errors, some may still exist so please interpret based on surrounding text content.

## 2023-11-28 ENCOUNTER — TELEPHONE (OUTPATIENT)
Dept: FAMILY MEDICINE CLINIC | Facility: CLINIC | Age: 63
End: 2023-11-28

## 2023-11-28 NOTE — TELEPHONE ENCOUNTER
Caller: JAYLENE PIPER    Relationship: Emergency Contact    Best call back number: [phone]     What is the best time to reach you: ANY    Who are you requesting to speak with (clinical staff, provider,  specific staff member): REFERRAL    What was the call regarding: JAYLENE IS WANTING TO DISCUSS WHY A NON TOXIC SINGLE THYROID NODULE TEST WAS SCHEDULED IN [place] [facility] THAN IN [place] AT [facility]     Is it okay if the provider responds through MyChart: NO       GASTROINTESTINAL - ADULT    • Maintains or returns to baseline bowel function Progressing        HEMATOLOGIC - ADULT    • Maintains hematologic stability Progressing    • Free from bleeding injury Progressing        SAFETY ADULT - FALL    • Free from fall

## 2023-11-28 NOTE — TELEPHONE ENCOUNTER
Spoke with pt yesterday when going over results that Nishi recommended Dr. Sam in [facility].

## 2024-04-03 ENCOUNTER — TELEPHONE (OUTPATIENT)
Dept: FAMILY MEDICINE CLINIC | Facility: CLINIC | Age: 64
End: 2024-04-03
Payer: OTHER GOVERNMENT

## 2024-04-03 DIAGNOSIS — R91.1 LUNG NODULE: Primary | ICD-10-CM

## 2024-04-03 NOTE — TELEPHONE ENCOUNTER
Referral placed,  will have to wait for Jason return to message her and see if she will accept the patient

## 2024-04-03 NOTE — TELEPHONE ENCOUNTER
PATIENT'S WIFE CALLED OFFICE (WIFE IS POWER OF ) STATING THAT THEY WANT TO SEE DEBBIE PRECIADO AS PCP SO BOTH ARE THE SAME. PATIENT IS ALSO NEEDING A NEW REFERRAL PLACED FOR PULM CRISTY FLANNERY. INFORMED PT WOULD SPEAK WITH CURRENT PCP TO GET UPDATED REFERRAL AND SPEAK WITH ILANA ABOUT GETTING A NEW PT APPT.

## 2024-04-11 ENCOUNTER — HOSPITAL ENCOUNTER (OUTPATIENT)
Dept: CT IMAGING | Facility: HOSPITAL | Age: 64
Discharge: HOME | End: 2024-04-11
Admitting: STUDENT IN AN ORGANIZED HEALTH CARE EDUCATION/TRAINING PROGRAM
Payer: OTHER GOVERNMENT

## 2024-04-11 DIAGNOSIS — R91.1 LUNG NODULE: ICD-10-CM

## 2024-04-11 DIAGNOSIS — R93.89 ABNORMAL CHEST CT: ICD-10-CM

## 2024-04-11 PROCEDURE — 71250 CT THORAX DX C-: CPT

## 2024-04-22 NOTE — PROGRESS NOTES
Primary Care Provider  Alicia Hightower APRN   Referring Provider  AARON Bustamante      Patient Complaint  Lung Nodule, Results (CT), and Follow-up      Subjective          Jay Muñiz presents to [facility]      History of Presenting Illness  Jay Muñiz is a 63 y.o. male patient of Dr. Velasquez with history of pulmonary nodule, and tobacco use in remission, here for 9-month follow-up.    Patient states he is doing well since his last visit, here today to go over repeat chest CT.  He is accompanied today by his wife.  Patient was initially seen by Dr. Velasquez as a new patient in lung nodule clinic for a 2 mm right middle lobe lung nodule [facility].  We repeated his chest CT 4/11/2024, which showed resolution of the 2 mm nodule.  Patient denies using any antibiotics or steroids for his lungs recently, denies any fevers or chills, has not needed to be hospitalized or had any ER visits for his breathing since his last appointment.  He does not have any respiratory complaints.  Patient does not currently use any inhalers or respiratory medications regularly.  He is a former smoker, quit 17 years ago, 45 pack years.  Patient denies any productive cough, hemoptysis, swollen lymph nodes, weight loss, or night sweats.  He previously worked in the  in office and on a submarine, now retired.  Overall, patient is doing well and has no additional concerns at this time.  Patient is able to perform ADLs without difficulty.  I have personally reviewed the review of systems, past family, social, medical and surgical histories; and agree with their findings.      Review of Systems    Review of Systems   Constitutional:  Negative for activity change, chills, fatigue, fever, unexpected weight gain and unexpected weight loss.   HENT:  Negative for congestion, ear discharge, ear pain, mouth sores, postnasal drip, rhinorrhea, sinus pressure, sore  throat, swollen glands and trouble swallowing.    Eyes:  Negative for blurred vision, pain, discharge, itching and visual disturbance.   Respiratory:  Negative for apnea, cough, chest tightness, shortness of breath, wheezing and stridor.    Cardiovascular:  Negative for chest pain, palpitations and leg swelling.   Gastrointestinal:  Negative for abdominal distention, abdominal pain, constipation, diarrhea, nausea, vomiting, GERD and indigestion.   Musculoskeletal:  Negative for arthralgias, joint swelling and myalgias.   Skin:  Negative for color change.   Neurological:  Negative for dizziness, weakness, light-headedness and headache.      Sleep: Negative for Excessive daytime sleepiness  Negative for morning headaches  Negative for Snoring      Family History   Problem Relation Age of Onset    Arthritis Mother     Asthma Mother     Arthritis Father     Cancer Father     Diabetes Father         Social History     Socioeconomic History    Marital status:    Tobacco Use    Smoking status: Former     Current packs/day: 0.00     Average packs/day: 1.5 packs/day for 30.0 years (45.0 ttl pk-yrs)     Types: Cigarettes     Start date: 1976     Quit date: 2006     Years since quittin.4    Smokeless tobacco: Never   Vaping Use    Vaping status: Never Used   Substance and Sexual Activity    Alcohol use: Not Currently     Comment: Social Drinker, haven't drank in years    Drug use: Never    Sexual activity: Not Currently     Partners: Female        Past Medical History:   Diagnosis Date    Arthritis     JOEY KNEE    Carpal tunnel syndrome     both hands    Lung nodule 2021    Sleep apnea, obstructive     LOST WEIGHT NO LONGER USES MACHINE        Immunization History   Administered Date(s) Administered    COVID-19 (CAROLYN) 2021, 10/29/2021    Flublok 18+yrs 10/23/2019, 10/20/2020    Fluzone (or Fluarix & Flulaval for VFC) >6mos 10/18/2022, 10/04/2023    Pneumococcal Conjugate 20-Valent (PCV20)  "06/12/2023    Shingrix 08/26/2022, 02/24/2023       No Known Allergies       Current Outpatient Medications:     ascorbic acid (VITAMIN C) 250 MG tablet, Take 1 tablet by mouth Daily., Disp: , Rfl:     Biotin 300 MCG tablet, Take  by mouth., Disp: , Rfl:     cholecalciferol (VITAMIN D3) 25 MCG (1000 UT) tablet, Take 1 tablet by mouth Daily., Disp: , Rfl:     Diclofenac Sodium (VOLTAREN) 1 % gel gel, Apply 4 g topically to the appropriate area as directed 4 (Four) Times a Day As Needed., Disp: , Rfl:     finasteride (PROSCAR) 5 MG tablet, Take 1 tablet by mouth Every Night., Disp: , Rfl:     multivitamin with minerals tablet tablet, Take 1 tablet by mouth Daily., Disp: , Rfl:     Naproxen Sodium (ALEVE PO), Take  by mouth. 500 mg  twice daily, Disp: , Rfl:     rosuvastatin (CRESTOR) 20 MG tablet, Take 1 tablet by mouth Every Night., Disp: , Rfl:     tamsulosin (FLOMAX) 0.4 MG capsule 24 hr capsule, Take 1 capsule by mouth Every Night., Disp: , Rfl:     TURMERIC PO, Take  by mouth., Disp: , Rfl:     aspirin (Ecotrin) 325 MG EC tablet, Take 1 tablet by mouth Daily. (Patient not taking: Reported on 4/25/2024), Disp: 14 tablet, Rfl: 0    folic acid-vit B6-vit B12 (FOLGARD) 2.2-25-1 MG tablet tablet, Take  by mouth. (Patient not taking: Reported on 4/25/2024), Disp: , Rfl:     HYDROcodone-acetaminophen (Norco) 7.5-325 MG per tablet, Take 1 tablet by mouth Every 4 (Four) Hours As Needed for Moderate Pain. (Patient not taking: Reported on 4/25/2024), Disp: 30 tablet, Rfl: 0     Objective     Vital Signs:   /51 (BP Location: Left arm, Patient Position: Sitting, Cuff Size: Large Adult)   Pulse 55   Temp 97.4 °F (36.3 °C) (Oral)   Resp 16   Ht 177.8 cm (70\")   Wt 93 kg (205 lb)   SpO2 99% Comment: room air  BMI 29.41 kg/m²     Physical Exam  Constitutional:       General: He is not in acute distress.     Appearance: Normal appearance. He is normal weight. He is not ill-appearing.   HENT:      Right Ear: Tympanic " membrane and ear canal normal.      Left Ear: Tympanic membrane and ear canal normal.      Nose: Nose normal.      Mouth/Throat:      Mouth: Mucous membranes are moist.      Pharynx: Oropharynx is clear.   Eyes:      Extraocular Movements: Extraocular movements intact.      Conjunctiva/sclera: Conjunctivae normal.      Pupils: Pupils are equal, round, and reactive to light.   Cardiovascular:      Rate and Rhythm: Normal rate and regular rhythm.      Pulses: Normal pulses.      Heart sounds: Normal heart sounds.   Pulmonary:      Effort: Pulmonary effort is normal. No respiratory distress.      Breath sounds: Normal breath sounds. No stridor. No wheezing, rhonchi or rales.   Abdominal:      General: Bowel sounds are normal.      Palpations: Abdomen is soft.   Musculoskeletal:         General: No swelling. Normal range of motion.      Cervical back: Normal range of motion and neck supple.      Right lower leg: No edema.      Left lower leg: No edema.   Skin:     General: Skin is warm and dry.   Neurological:      General: No focal deficit present.      Mental Status: He is alert and oriented to person, place, and time.      Motor: No weakness.   Psychiatric:         Mood and Affect: Mood normal.         Behavior: Behavior normal.        Result Review :   I have personally reviewed patient's labs and images, including chest CT 4/11/2024.  I also reviewed Dr. Velasquez's last office note 6/12/2023.            Diagnoses and all orders for this visit:    1. Lung nodule (Primary)    2. Pulmonary emphysema, unspecified emphysema type    3. Tobacco abuse, in remission       Impression and Plan    -CT chest 4/11/2024 showed no suspicious pulmonary nodules, previously noted 2 mm right middle lobe nodule was not definitively seen.  There was minimal centrilobular emphysema.  -Patient quit smoking 17 years ago, does not qualify for annual LDCT lung cancer screening.  -Follow-up on an as-needed basis    Smoking status:  Reviewed  Vaccination status: Patient reports he is up-to-date with his flu, pneumonia, and Covid vaccines.  Patient is advised to continue to follow CDC recommendations such as social distancing wearing a mask and washing hands for at least 20 seconds.  Medications personally reviewed    Follow Up   No follow-ups on file.  Patient was given instructions and counseling regarding his condition or for health maintenance advice. Please see specific information pulled into the AVS if appropriate.

## 2024-04-25 ENCOUNTER — OFFICE VISIT (OUTPATIENT)
Dept: PULMONOLOGY | Facility: CLINIC | Age: 64
End: 2024-04-25
Payer: OTHER GOVERNMENT

## 2024-04-25 VITALS
TEMPERATURE: 97.4 F | BODY MASS INDEX: 29.35 KG/M2 | HEIGHT: 70 IN | WEIGHT: 205 LBS | HEART RATE: 55 BPM | OXYGEN SATURATION: 99 % | SYSTOLIC BLOOD PRESSURE: 108 MMHG | DIASTOLIC BLOOD PRESSURE: 51 MMHG | RESPIRATION RATE: 16 BRPM

## 2024-04-25 DIAGNOSIS — R91.1 LUNG NODULE: Primary | ICD-10-CM

## 2024-04-25 DIAGNOSIS — J43.9 PULMONARY EMPHYSEMA, UNSPECIFIED EMPHYSEMA TYPE: ICD-10-CM

## 2024-04-25 DIAGNOSIS — F17.201 TOBACCO ABUSE, IN REMISSION: ICD-10-CM

## 2024-04-25 PROCEDURE — 99214 OFFICE O/P EST MOD 30 MIN: CPT

## 2024-04-26 ENCOUNTER — OFFICE VISIT (OUTPATIENT)
Dept: ORTHOPEDIC SURGERY | Facility: CLINIC | Age: 64
End: 2024-04-26
Payer: OTHER GOVERNMENT

## 2024-04-26 VITALS
OXYGEN SATURATION: 95 % | SYSTOLIC BLOOD PRESSURE: 131 MMHG | HEART RATE: 59 BPM | DIASTOLIC BLOOD PRESSURE: 79 MMHG | WEIGHT: 205 LBS | BODY MASS INDEX: 29.35 KG/M2 | HEIGHT: 70 IN

## 2024-04-26 DIAGNOSIS — Z47.89 AFTERCARE FOLLOWING SURGERY OF THE MUSCULOSKELETAL SYSTEM: Primary | ICD-10-CM

## 2024-04-26 DIAGNOSIS — M17.11 PRIMARY OSTEOARTHRITIS OF RIGHT KNEE: ICD-10-CM

## 2024-04-26 RX ORDER — LIDOCAINE HYDROCHLORIDE 10 MG/ML
5 INJECTION, SOLUTION INFILTRATION; PERINEURAL
Status: COMPLETED | OUTPATIENT
Start: 2024-04-26 | End: 2024-04-26

## 2024-04-26 RX ORDER — TRIAMCINOLONE ACETONIDE 40 MG/ML
40 INJECTION, SUSPENSION INTRA-ARTICULAR; INTRAMUSCULAR
Status: COMPLETED | OUTPATIENT
Start: 2024-04-26 | End: 2024-04-26

## 2024-04-26 RX ADMIN — TRIAMCINOLONE ACETONIDE 40 MG: 40 INJECTION, SUSPENSION INTRA-ARTICULAR; INTRAMUSCULAR at 10:20

## 2024-04-26 RX ADMIN — LIDOCAINE HYDROCHLORIDE 5 ML: 10 INJECTION, SOLUTION INFILTRATION; PERINEURAL at 10:20

## 2024-04-26 NOTE — PROGRESS NOTES
"Chief Complaint  Follow-up of the Right Knee     Subjective      Jay Muñiz presents to [facility] for follow up evaluation of the right knee. The patient is S/P right knee arthroscopic partial medial meniscectomy, chondroplasty, 2023. He reports he hyperextended his knee about 2.5 weeks ago and has had medial pain since. He reports it is similar as to how it was prior to surgery.     No Known Allergies     Social History     Socioeconomic History    Marital status:    Tobacco Use    Smoking status: Former     Current packs/day: 0.00     Average packs/day: 1.5 packs/day for 30.0 years (45.0 ttl pk-yrs)     Types: Cigarettes     Start date: 1976     Quit date: 2006     Years since quittin.4    Smokeless tobacco: Never   Vaping Use    Vaping status: Never Used   Substance and Sexual Activity    Alcohol use: Not Currently     Comment: Social Drinker, haven't drank in years    Drug use: Never    Sexual activity: Not Currently     Partners: Female        I reviewed the patient's chief complaint, history of present illness, review of systems, past medical history, surgical history, family history, social history, medications, and allergy list.     Review of Systems     Constitutional: Denies fevers, chills, weight loss  Cardiovascular: Denies chest pain, shortness of breath  Skin: Denies rashes, acute skin changes  Neurologic: Denies headache, loss of consciousness  MSK: Right knee pain      Vital Signs:   /79   Pulse 59   Ht 177.8 cm (70\")   Wt 93 kg (205 lb)   SpO2 95%   BMI 29.41 kg/m²          Physical Exam  General: Alert. No acute distress    Ortho Exam      Right knee- Positive EHL, FHL, GS and TA. Sensation intact to all 5 nerves of the foot. Positive pulses. Tender to the medial knee. Positive Cosmo's. Negative Lachman's. Stable to varus/valgus stress. Stable to anterior/posterior drawer. Knee Extensor Mechanism  intact. ROM 0-120 degrees. " Pain with extension. No effusion.     Right knee: R knee  Date/Time: 4/26/2024 10:20 AM  Consent given by: patient  Site marked: site marked  Timeout: Immediately prior to procedure a time out was called to verify the correct patient, procedure, equipment, support staff and site/side marked as required   Supporting Documentation  Indications: pain   Procedure Details  Location: knee - R knee  Needle gauge: 21G.  Medications administered: 5 mL lidocaine 1 %; 40 mg triamcinolone acetonide 40 MG/ML  Patient tolerance: patient tolerated the procedure well with no immediate complications      This injection documentation was Scribed for Washington Bishop MD by Marylu Bustamante.  04/26/24   10:20 EDT      X-Ray Report:  Right knee X-Ray  Indication: Evaluation of right knee pain  AP/Lateral, Standing, and [place] view(s)  Findings: moderate advanced degenerative changes, near bone on bone to the medial compartment.   Prior studies available for comparison: yes      Imaging Results (Most Recent)       Procedure Component Value Units Date/Time    XR Knee 3 View Right [[id number]] Resulted: 04/26/24 0944     Updated: 04/26/24 0947             Result Review :       CT Chest Without Contrast Diagnostic    Result Date: 4/12/2024  Narrative: EXAM:CT CHEST WO CONTRAST DIAGNOSTIC-  DATE OF EXAM: 4/11/2024 2:10 PM  INDICATION: lung nodule; R91.1-Solitary pulmonary nodule; R93.89-Abnormal findings on diagnostic imaging of other specified body structures.  COMPARISON: CT thoracic spine 3/28/2023  TECHNIQUE: Serial and axial CT images of the chest were obtained. Reconstructions in the coronal and sagittal planes were performed. Automated exposure control and iterative reconstruction methods were used.  FINDINGS: Thyroid unremarkable. No supraclavicular adenopathy. Mild aortic atherosclerotic disease without aneurysm. Heart size appears within normal limits. Mild to moderate calcified coronary atherosclerotic disease. No pericardial  effusion. Normal caliber main pulmonary artery. Thoracic esophagus unremarkable. No suspicious mediastinal or hilar adenopathy.  No acute findings within the partially imaged upper abdomen. Minimal symmetric gynecomastia. No axillary adenopathy. No acute chest wall abnormalities. Minimal degenerative changes of the spine without acute displaced fracture or aggressive bone lesion.  Trachea and central airways patent. No infectious consolidation, edema, effusion or pneumothorax. Minimal centrilobular emphysema. Mild motion degradation of the lower lobes. No suspicious pulmonary nodule. Previously reported 2 mm right middle lobe nodule not clearly seen.      Impression: 1. No suspicious nodules. Previously noted 2 mm middle lobe nodule not definitively seen. 2. Minimal centrilobular emphysema. Consider low-dose chest CT lung screening based on patient's eligibility. 3. No acute infectious/inflammatory process. 4. Aortic and mild to moderate calcified coronary atherosclerotic disease.     Electronically Signed By-Mik Avila MD On:4/12/2024 11:25 AM              Assessment and Plan     Diagnoses and all orders for this visit:    1. S/P right knee arthroscopic partial medial meniscectomy, chondroplasty, 11/13/2023. (Primary)  -     XR Knee 3 View Right    2. Primary osteoarthritis of right knee        Discussed the treatment plan with the patient.  I reviewed the x-rays that were obtained today with the patient. Discussed the risks and benefits of a right knee steroid injection. The patient expressed understanding and wished to proceed. He tolerated the injection well. Plan to seek approval for Visco injection.     Call or return if worsening symptoms.    Follow Up     3 months      Patient was given instructions and counseling regarding his condition or for health maintenance advice. Please see specific information pulled into the AVS if appropriate.     Scribed for Washington Bishop MD by Amie  Katlyn.  04/26/24   09:42 EDT    I have personally performed the services described in this document as scribed by the above individual and it is both accurate and complete. Washington Bishop MD 04/26/24

## 2024-04-30 ENCOUNTER — OFFICE VISIT (OUTPATIENT)
Dept: FAMILY MEDICINE CLINIC | Facility: CLINIC | Age: 64
End: 2024-04-30
Payer: OTHER GOVERNMENT

## 2024-04-30 VITALS
WEIGHT: 210 LBS | BODY MASS INDEX: 30.06 KG/M2 | SYSTOLIC BLOOD PRESSURE: 114 MMHG | DIASTOLIC BLOOD PRESSURE: 57 MMHG | HEART RATE: 54 BPM | HEIGHT: 70 IN | OXYGEN SATURATION: 100 %

## 2024-04-30 DIAGNOSIS — I25.10 ATHEROSCLEROSIS OF CORONARY ARTERY WITHOUT ANGINA PECTORIS, UNSPECIFIED VESSEL OR LESION TYPE, UNSPECIFIED WHETHER NATIVE OR TRANSPLANTED HEART: ICD-10-CM

## 2024-04-30 DIAGNOSIS — Z00.00 ANNUAL PHYSICAL EXAM: Primary | ICD-10-CM

## 2024-04-30 DIAGNOSIS — Z11.59 ENCOUNTER FOR HEPATITIS C SCREENING TEST FOR LOW RISK PATIENT: ICD-10-CM

## 2024-04-30 DIAGNOSIS — R53.83 FATIGUE, UNSPECIFIED TYPE: ICD-10-CM

## 2024-04-30 DIAGNOSIS — E04.1 THYROID NODULE: ICD-10-CM

## 2024-04-30 DIAGNOSIS — R10.13 DYSPEPSIA: ICD-10-CM

## 2024-04-30 PROCEDURE — 99213 OFFICE O/P EST LOW 20 MIN: CPT | Performed by: NURSE PRACTITIONER

## 2024-04-30 PROCEDURE — 99396 PREV VISIT EST AGE 40-64: CPT | Performed by: NURSE PRACTITIONER

## 2024-04-30 NOTE — PROGRESS NOTES
Chief Complaint  Establish Care and Annual Exam  Annual physical exam     SUBJECTIVE  Jay Muñiz presents to [facility] est care as a new pt, previous pcp, Nishi Albright.      Annual physical exam  Pt reports he had labwork completed on march 18, 2024 and he will send me copy of results.   Patient reports he was scheduled for thyroid biopsy in [place] but canceled appointment.  He prefers to have repeat thyroid ultrasound completed instead.  Pt c/o frequent dyspepsia and stomach issues. Pt states he was taking pepto bismol frequently -states he has not had any issues for a few weeks now.    Pt needs a new referral to Dr. Bishop for R knee.     History of Present Illness  Past Medical History:   Diagnosis Date    Arthritis 2006    JOEY KNEE    Carpal tunnel syndrome     both hands    Lung nodule Jan 2021    Sleep apnea, obstructive     LOST WEIGHT NO LONGER USES MACHINE      Family History   Problem Relation Age of Onset    Arthritis Mother     Asthma Mother     Arthritis Father     Cancer Father     Diabetes Father       Past Surgical History:   Procedure Laterality Date    CARPAL TUNNEL RELEASE      ELBOW PROCEDURE      EYE SURGERY  Sept/Oct 2020    KNEE ARTHROSCOPY Right 11/13/2023    Procedure: RIGHT KNEE ARTHROSCOPY WITH PARTIAL MEDIAL MENISCECTOMY AND CHONDROPLASTY;  Surgeon: Washington Bishop MD;  [place] [facility] [place] [facility] [place]opedics;  Laterality: Right;    ROTATOR CUFF REPAIR Left     SHOULDER SURGERY Right         Current Outpatient Medications:     ascorbic acid (VITAMIN C) 250 MG tablet, Take 1 tablet by mouth Daily., Disp: , Rfl:     Biotin 300 MCG tablet, Take  by mouth., Disp: , Rfl:     cholecalciferol (VITAMIN D3) 25 MCG (1000 UT) tablet, Take 1 tablet by mouth Daily., Disp: , Rfl:     Diclofenac Sodium (VOLTAREN) 1 % gel gel, Apply 4 g topically to the appropriate area as directed 4 (Four) Times a Day As Needed., Disp: , Rfl:     finasteride  "(PROSCAR) 5 MG tablet, Take 1 tablet by mouth Every Night., Disp: , Rfl:     multivitamin with minerals tablet tablet, Take 1 tablet by mouth Daily., Disp: , Rfl:     Naproxen Sodium (ALEVE PO), Take  by mouth. 500 mg  twice daily, Disp: , Rfl:     rosuvastatin (CRESTOR) 20 MG tablet, Take 1 tablet by mouth Every Night., Disp: , Rfl:     tamsulosin (FLOMAX) 0.4 MG capsule 24 hr capsule, Take 1 capsule by mouth Every Night., Disp: , Rfl:     TURMERIC PO, Take  by mouth., Disp: , Rfl:     OBJECTIVE  Vital Signs:   /57   Pulse 54   Ht 177.8 cm (70\")   Wt 95.3 kg (210 lb)   SpO2 100%   BMI 30.13 kg/m²    Estimated body mass index is 30.13 kg/m² as calculated from the following:    Height as of this encounter: 177.8 cm (70\").    Weight as of this encounter: 95.3 kg (210 lb).     Wt Readings from Last 3 Encounters:   04/30/24 95.3 kg (210 lb)   04/26/24 93 kg (205 lb)   04/25/24 93 kg (205 lb)     BP Readings from Last 3 Encounters:   04/30/24 114/57   04/26/24 131/79   04/25/24 108/51       Physical Exam  Vitals reviewed.   Constitutional:       Appearance: Normal appearance. He is well-developed.   HENT:      Head: Normocephalic and atraumatic.      Right Ear: External ear normal.      Left Ear: External ear normal.      Mouth/Throat:      Pharynx: No oropharyngeal exudate.   Eyes:      Conjunctiva/sclera: Conjunctivae normal.      Pupils: Pupils are equal, round, and reactive to light.   Neck:      Vascular: No carotid bruit.   Cardiovascular:      Rate and Rhythm: Normal rate and regular rhythm.      Pulses: Normal pulses.      Heart sounds: Normal heart sounds. No murmur heard.     No friction rub. No gallop.   Pulmonary:      Effort: Pulmonary effort is normal.      Breath sounds: Normal breath sounds. No wheezing or rhonchi.   Skin:     General: Skin is warm and dry.   Neurological:      Mental Status: He is alert and oriented to person, place, and time.      Cranial Nerves: No cranial nerve deficit. "   Psychiatric:         Mood and Affect: Mood and affect normal.         Behavior: Behavior normal.         Thought Content: Thought content normal.         Judgment: Judgment normal.          Result Review        CT Chest Without Contrast Diagnostic    Result Date: 4/12/2024  1. No suspicious nodules. Previously noted 2 mm middle lobe nodule not definitively seen. 2. Minimal centrilobular emphysema. Consider low-dose chest CT lung screening based on patient's eligibility. 3. No acute infectious/inflammatory process. 4. Aortic and mild to moderate calcified coronary atherosclerotic disease.     Electronically Signed By-Mik Avila MD On:4/12/2024 11:25 AM         The above data has been reviewed by AARON Warren 04/30/2024 11:33 EDT.          Patient Care Team:  Alicia Hightower APRN as PCP - General (Family Medicine)  Inessa Prasad APRN as Nurse Practitioner (Pulmonary Disease)  Bayron Velasquez MD as Consulting Physician (Pulmonary Disease)            ASSESSMENT & PLAN    Diagnoses and all orders for this visit:    1. Annual physical exam (Primary)    2. Fatigue, unspecified type  Comments:  Testosterone questionnaire completed, will check lab.  Orders:  -     Testosterone, Free, Total; Future  -     CBC & Differential; Future  -     Comprehensive Metabolic Panel; Future  -     EBV Antibody Profile; Future  -     CMV IgG IgM; Future  -     Vitamin B12; Future  -     Folate; Future    3. Thyroid nodule  Comments:  Repeat thyroid ultrasound  Overview:  Repeat thyroid ultrasound    Orders:  -     TSH; Future  -     T4, Free; Future  -     US Thyroid; Future    4. Encounter for hepatitis C screening test for low risk patient  -     Hepatitis C antibody; Future    5. Dyspepsia  Comments:  Check H. pylori  Orders:  -     H. Pylori Breath Test - Breath, Lung; Future    6. Atherosclerosis of coronary artery without angina pectoris, unspecified vessel or lesion type, unspecified whether native or transplanted  heart  Comments:  Incidental finding noted on CT scan of chest.  Patient declines any further workup at office visit.  Will call me if changes mind.     The patient is advised to continue current medications, continue current healthy lifestyle patterns, and return for routine annual checkups.      Tobacco Use: Medium Risk (4/30/2024)    Patient History     Smoking Tobacco Use: Former     Smokeless Tobacco Use: Never     Passive Exposure: Not on file       Follow Up     Return in about 6 months (around 10/30/2024).        Patient was given instructions and counseling regarding his condition or for health maintenance advice. Please see specific information pulled into the AVS if appropriate.   I have reviewed information obtained and documented by others and I have confirmed the accuracy of this documented note.    Alicia Hightower, APRN

## 2024-05-01 ENCOUNTER — LAB (OUTPATIENT)
Dept: LAB | Facility: HOSPITAL | Age: 64
End: 2024-05-01
Payer: OTHER GOVERNMENT

## 2024-05-01 DIAGNOSIS — R53.83 FATIGUE, UNSPECIFIED TYPE: ICD-10-CM

## 2024-05-01 DIAGNOSIS — E04.1 THYROID NODULE: ICD-10-CM

## 2024-05-01 DIAGNOSIS — Z11.59 ENCOUNTER FOR HEPATITIS C SCREENING TEST FOR LOW RISK PATIENT: ICD-10-CM

## 2024-05-01 LAB
ALBUMIN SERPL-MCNC: 4.7 G/DL (ref 3.5–5.2)
ALBUMIN/GLOB SERPL: 2 G/DL
ALP SERPL-CCNC: 72 U/L (ref 39–117)
ALT SERPL W P-5'-P-CCNC: 32 U/L (ref 1–41)
ANION GAP SERPL CALCULATED.3IONS-SCNC: 8.9 MMOL/L (ref 5–15)
AST SERPL-CCNC: 33 U/L (ref 1–40)
BASOPHILS # BLD AUTO: 0.06 10*3/MM3 (ref 0–0.2)
BASOPHILS NFR BLD AUTO: 1.2 % (ref 0–1.5)
BILIRUB SERPL-MCNC: 0.3 MG/DL (ref 0–1.2)
BUN SERPL-MCNC: 21 MG/DL (ref 8–23)
BUN/CREAT SERPL: 19.4 (ref 7–25)
CALCIUM SPEC-SCNC: 10.5 MG/DL (ref 8.6–10.5)
CHLORIDE SERPL-SCNC: 107 MMOL/L (ref 98–107)
CO2 SERPL-SCNC: 29.1 MMOL/L (ref 22–29)
CREAT SERPL-MCNC: 1.08 MG/DL (ref 0.76–1.27)
DEPRECATED RDW RBC AUTO: 36.3 FL (ref 37–54)
EGFRCR SERPLBLD CKD-EPI 2021: 77.6 ML/MIN/1.73
EOSINOPHIL # BLD AUTO: 0.14 10*3/MM3 (ref 0–0.4)
EOSINOPHIL NFR BLD AUTO: 2.7 % (ref 0.3–6.2)
ERYTHROCYTE [DISTWIDTH] IN BLOOD BY AUTOMATED COUNT: 11.8 % (ref 12.3–15.4)
FOLATE SERPL-MCNC: >20 NG/ML (ref 4.78–24.2)
GLOBULIN UR ELPH-MCNC: 2.3 GM/DL
GLUCOSE SERPL-MCNC: 105 MG/DL (ref 65–99)
HCT VFR BLD AUTO: 43.4 % (ref 37.5–51)
HCV AB SER QL: NORMAL
HGB BLD-MCNC: 14 G/DL (ref 13–17.7)
IMM GRANULOCYTES # BLD AUTO: 0.01 10*3/MM3 (ref 0–0.05)
IMM GRANULOCYTES NFR BLD AUTO: 0.2 % (ref 0–0.5)
LYMPHOCYTES # BLD AUTO: 1.34 10*3/MM3 (ref 0.7–3.1)
LYMPHOCYTES NFR BLD AUTO: 26.1 % (ref 19.6–45.3)
MCH RBC QN AUTO: 27.4 PG (ref 26.6–33)
MCHC RBC AUTO-ENTMCNC: 32.3 G/DL (ref 31.5–35.7)
MCV RBC AUTO: 84.9 FL (ref 79–97)
MONOCYTES # BLD AUTO: 0.43 10*3/MM3 (ref 0.1–0.9)
MONOCYTES NFR BLD AUTO: 8.4 % (ref 5–12)
NEUTROPHILS NFR BLD AUTO: 3.16 10*3/MM3 (ref 1.7–7)
NEUTROPHILS NFR BLD AUTO: 61.4 % (ref 42.7–76)
NRBC BLD AUTO-RTO: 0 /100 WBC (ref 0–0.2)
PLATELET # BLD AUTO: 154 10*3/MM3 (ref 140–450)
PMV BLD AUTO: 14 FL (ref 6–12)
POTASSIUM SERPL-SCNC: 4.4 MMOL/L (ref 3.5–5.2)
PROT SERPL-MCNC: 7 G/DL (ref 6–8.5)
RBC # BLD AUTO: 5.11 10*6/MM3 (ref 4.14–5.8)
SODIUM SERPL-SCNC: 145 MMOL/L (ref 136–145)
T4 FREE SERPL-MCNC: 1.38 NG/DL (ref 0.93–1.7)
TSH SERPL DL<=0.05 MIU/L-ACNC: 0.73 UIU/ML (ref 0.27–4.2)
VIT B12 BLD-MCNC: 670 PG/ML (ref 211–946)
WBC NRBC COR # BLD AUTO: 5.14 10*3/MM3 (ref 3.4–10.8)

## 2024-05-01 PROCEDURE — 86665 EPSTEIN-BARR CAPSID VCA: CPT

## 2024-05-01 PROCEDURE — 85025 COMPLETE CBC W/AUTO DIFF WBC: CPT

## 2024-05-01 PROCEDURE — 84443 ASSAY THYROID STIM HORMONE: CPT

## 2024-05-01 PROCEDURE — 84402 ASSAY OF FREE TESTOSTERONE: CPT

## 2024-05-01 PROCEDURE — 82746 ASSAY OF FOLIC ACID SERUM: CPT

## 2024-05-01 PROCEDURE — 80053 COMPREHEN METABOLIC PANEL: CPT

## 2024-05-01 PROCEDURE — 84439 ASSAY OF FREE THYROXINE: CPT

## 2024-05-01 PROCEDURE — 86664 EPSTEIN-BARR NUCLEAR ANTIGEN: CPT

## 2024-05-01 PROCEDURE — 84403 ASSAY OF TOTAL TESTOSTERONE: CPT

## 2024-05-01 PROCEDURE — 86645 CMV ANTIBODY IGM: CPT

## 2024-05-01 PROCEDURE — 86644 CMV ANTIBODY: CPT

## 2024-05-01 PROCEDURE — 86803 HEPATITIS C AB TEST: CPT

## 2024-05-01 PROCEDURE — 36415 COLL VENOUS BLD VENIPUNCTURE: CPT

## 2024-05-01 PROCEDURE — 82607 VITAMIN B-12: CPT

## 2024-05-02 ENCOUNTER — LAB (OUTPATIENT)
Dept: LAB | Facility: HOSPITAL | Age: 64
End: 2024-05-02
Payer: OTHER GOVERNMENT

## 2024-05-02 DIAGNOSIS — R10.13 DYSPEPSIA: ICD-10-CM

## 2024-05-02 LAB
CMV IGG SERPL IA-ACNC: 3.1 U/ML (ref 0–0.59)
CMV IGM SERPL IA-ACNC: <30 AU/ML (ref 0–29.9)
EBV NA IGG SER IA-ACNC: 126 U/ML (ref 0–17.9)
EBV VCA IGG SER IA-ACNC: >600 U/ML (ref 0–17.9)
EBV VCA IGM SER IA-ACNC: <36 U/ML (ref 0–35.9)
SERVICE CMNT-IMP: ABNORMAL

## 2024-05-02 PROCEDURE — 83013 H PYLORI (C-13) BREATH: CPT

## 2024-05-03 LAB — UREA BREATH TEST QL: NEGATIVE

## 2024-05-06 ENCOUNTER — TELEPHONE (OUTPATIENT)
Dept: ORTHOPEDIC SURGERY | Facility: CLINIC | Age: 64
End: 2024-05-06

## 2024-05-06 NOTE — TELEPHONE ENCOUNTER
[facility] received a fax that requires your attention. The document has been indexed to the patient’s chart for your review.      Reason for sending: RECEIVED AUTHORIZATION FOR SYNVISC    Documents Description: HUMAN[facility]A -ORTHO/SYNVISC-5/2/2024    Name of Sender: HUMAN     Date Indexed: 5/6/2024

## 2024-05-07 ENCOUNTER — TELEPHONE (OUTPATIENT)
Dept: ORTHOPEDIC SURGERY | Facility: CLINIC | Age: 64
End: 2024-05-07
Payer: OTHER GOVERNMENT

## 2024-05-07 DIAGNOSIS — R79.89 LOW TESTOSTERONE: Primary | ICD-10-CM

## 2024-05-07 LAB
TESTOST FREE SERPL-MCNC: 3.9 PG/ML (ref 6.6–18.1)
TESTOST SERPL-MCNC: 623 NG/DL (ref 264–916)

## 2024-05-08 ENCOUNTER — LAB (OUTPATIENT)
Dept: LAB | Facility: HOSPITAL | Age: 64
End: 2024-05-08
Payer: OTHER GOVERNMENT

## 2024-05-08 DIAGNOSIS — R79.89 LOW TESTOSTERONE: ICD-10-CM

## 2024-05-08 PROCEDURE — 36415 COLL VENOUS BLD VENIPUNCTURE: CPT

## 2024-05-08 PROCEDURE — 84402 ASSAY OF FREE TESTOSTERONE: CPT

## 2024-05-08 PROCEDURE — 84403 ASSAY OF TOTAL TESTOSTERONE: CPT

## 2024-05-09 ENCOUNTER — HOSPITAL ENCOUNTER (OUTPATIENT)
Dept: ULTRASOUND IMAGING | Facility: HOSPITAL | Age: 64
Discharge: HOME | End: 2024-05-09
Admitting: NURSE PRACTITIONER
Payer: OTHER GOVERNMENT

## 2024-05-09 DIAGNOSIS — E04.1 THYROID NODULE: ICD-10-CM

## 2024-05-09 PROCEDURE — 76536 US EXAM OF HEAD AND NECK: CPT

## 2024-05-14 LAB
TESTOST FREE SERPL-MCNC: 4.6 PG/ML (ref 6.6–18.1)
TESTOST SERPL-MCNC: 648 NG/DL (ref 264–916)

## 2024-05-15 DIAGNOSIS — R79.89 LOW TESTOSTERONE IN MALE: Primary | ICD-10-CM

## 2024-07-02 ENCOUNTER — OFFICE VISIT (OUTPATIENT)
Dept: ORTHOPEDIC SURGERY | Facility: CLINIC | Age: 64
End: 2024-07-02
Payer: OTHER GOVERNMENT

## 2024-07-02 VITALS
OXYGEN SATURATION: 95 % | BODY MASS INDEX: 29.2 KG/M2 | WEIGHT: 204 LBS | DIASTOLIC BLOOD PRESSURE: 72 MMHG | HEIGHT: 70 IN | SYSTOLIC BLOOD PRESSURE: 119 MMHG | HEART RATE: 55 BPM

## 2024-07-02 DIAGNOSIS — Z47.89 AFTERCARE FOLLOWING SURGERY OF THE MUSCULOSKELETAL SYSTEM: ICD-10-CM

## 2024-07-02 DIAGNOSIS — M17.11 PRIMARY OSTEOARTHRITIS OF RIGHT KNEE: Primary | ICD-10-CM

## 2024-07-02 NOTE — PROGRESS NOTES
"Chief Complaint  Follow-up of the Right Knee     Subjective      Jay Muñiz presents to [facility] for a follow up for his right knee. He has been treating his osteoarthritis conservatively with injections. He was last seen in the office on 24 where he had a right knee steroid injection. He states this gave him some relief. He also underwent a right knee arthroscopy with partial medial meniscectomy and chondroplasty performed on 23. He reports no new injury, trauma or falls since his last visit.     No Known Allergies     Social History     Socioeconomic History    Marital status:    Tobacco Use    Smoking status: Former     Current packs/day: 0.00     Average packs/day: 1.5 packs/day for 30.0 years (45.0 ttl pk-yrs)     Types: Cigarettes     Start date: 1976     Quit date: 2006     Years since quittin.6    Smokeless tobacco: Never   Vaping Use    Vaping status: Never Used   Substance and Sexual Activity    Alcohol use: Not Currently     Comment: Social Drinker, haven't drank in years    Drug use: Never    Sexual activity: Not Currently     Partners: Female        I reviewed the patient's chief complaint, history of present illness, review of systems, past medical history, surgical history, family history, social history, medications, and allergy list.     Review of Systems     Constitutional: Denies fevers, chills, weight loss  Cardiovascular: Denies chest pain, shortness of breath  Skin: Denies rashes, acute skin changes  Neurologic: Denies headache, loss of consciousness  MSK: Right knee pain       Vital Signs:   /72   Pulse 55   Ht 177.8 cm (70\")   Wt 92.5 kg (204 lb)   SpO2 95%   BMI 29.27 kg/m²            Ortho Exam    Physical Exam  General:Alert. No acute distress   Right lower extremity: Positive EHL, FHL, GS and TA. Sensation intact to all 5 nerves of the foot. Positive pulses. Tender to the medial knee. Positive Cosmo's. " Negative Lachman's. Stable to varus/valgus stress. Stable to anterior/posterior drawer. Knee Extensor Mechanism  intact. ROM 0-120 degrees. Pain with extension. No effusion.        Large Joint Arthrocentesis: R knee  Date/Time: 7/2/2024 8:21 AM  Consent given by: patient  Site marked: site marked  Timeout: Immediately prior to procedure a time out was called to verify the correct patient, procedure, equipment, support staff and site/side marked as required   Supporting Documentation  Indications: pain   Procedure Details  Location: knee - R knee  Needle gauge: 21g.  Medications administered: 48 mg hylan 48 MG/6ML  Patient tolerance: patient tolerated the procedure well with no immediate complications    This injection documentation was Scribed for Washington Bishop MD by Calista Louis MA.  07/02/24   08:22 EDT    Imaging Results (Most Recent)       None             Result Review :       No results found.           Assessment and Plan     Diagnoses and all orders for this visit:    1. Primary osteoarthritis of right knee (Primary)    2. S/P right knee arthroscopic partial medial meniscectomy, chondroplasty, 11/13/2023.        The patient presents here today for a follow up for his right knee osteoarthritis.     Discussed the risks and benefits of a right knee synvisc one injection today in the office. Patient expressed understanding and wishes to proceed. He tolerated the injection well and without any complications.     Call or return if worsening symptoms.    Follow Up     3 months     Patient was given instructions and counseling regarding his condition or for health maintenance advice. Please see specific information pulled into the AVS if appropriate.     Scribed for Washington Bishop MD by Marylu Bustamante.  07/02/24   08:01 EDT    I have personally performed the services described in this document as scribed by the above individual and it is both accurate and complete. Washington Bishop MD 07/03/24

## 2024-10-22 ENCOUNTER — OFFICE VISIT (OUTPATIENT)
Dept: ORTHOPEDIC SURGERY | Facility: CLINIC | Age: 64
End: 2024-10-22
Payer: OTHER GOVERNMENT

## 2024-10-22 VITALS
DIASTOLIC BLOOD PRESSURE: 66 MMHG | OXYGEN SATURATION: 96 % | WEIGHT: 204 LBS | BODY MASS INDEX: 29.2 KG/M2 | SYSTOLIC BLOOD PRESSURE: 112 MMHG | HEIGHT: 70 IN | HEART RATE: 58 BPM

## 2024-10-22 DIAGNOSIS — M17.11 PRIMARY OSTEOARTHRITIS OF RIGHT KNEE: Primary | ICD-10-CM

## 2024-10-22 DIAGNOSIS — Z47.89 AFTERCARE FOLLOWING SURGERY OF THE MUSCULOSKELETAL SYSTEM: ICD-10-CM

## 2024-10-22 RX ORDER — ACETAMINOPHEN 500 MG
TABLET ORAL
COMMUNITY

## 2024-10-22 RX ORDER — NAPROXEN 500 MG/1
500 TABLET ORAL
COMMUNITY
Start: 2024-04-29

## 2024-10-22 RX ORDER — LIDOCAINE HYDROCHLORIDE 10 MG/ML
5 INJECTION, SOLUTION INFILTRATION; PERINEURAL
Status: COMPLETED | OUTPATIENT
Start: 2024-10-22 | End: 2024-10-22

## 2024-10-22 RX ORDER — TRIAMCINOLONE ACETONIDE 40 MG/ML
40 INJECTION, SUSPENSION INTRA-ARTICULAR; INTRAMUSCULAR
Status: COMPLETED | OUTPATIENT
Start: 2024-10-22 | End: 2024-10-22

## 2024-10-22 RX ORDER — DIMENHYDRINATE 50 MG
TABLET ORAL
COMMUNITY

## 2024-10-22 RX ADMIN — LIDOCAINE HYDROCHLORIDE 5 ML: 10 INJECTION, SOLUTION INFILTRATION; PERINEURAL at 08:16

## 2024-10-22 RX ADMIN — TRIAMCINOLONE ACETONIDE 40 MG: 40 INJECTION, SUSPENSION INTRA-ARTICULAR; INTRAMUSCULAR at 08:16

## 2024-10-22 NOTE — PROGRESS NOTES
"Chief Complaint  Pain and Follow-up of the Right Knee       Subjective      Jay Muñiz presents to [facility] for a follow up for his right knee. He has been treating his right knee osteoarthritis conservatively. He was last seen in the office on 24 where he had a right knee Synvisc one injection. He reports this gave him so relief and is requesting a right knee steroid injection today in the office. He reports no new injury or falls since his last visit. He underwent a right knee arthroscopy with partial medial menisectomy and chondroplasty performed on 23.     No Known Allergies     Social History     Socioeconomic History    Marital status:    Tobacco Use    Smoking status: Former     Current packs/day: 0.00     Average packs/day: 1.5 packs/day for 30.0 years (45.0 ttl pk-yrs)     Types: Cigarettes     Start date: 1976     Quit date: 2006     Years since quittin.9    Smokeless tobacco: Never   Vaping Use    Vaping status: Never Used   Substance and Sexual Activity    Alcohol use: Not Currently     Comment: Social Drinker, haven't drank in years    Drug use: Never    Sexual activity: Not Currently     Partners: Female        I reviewed the patient's chief complaint, history of present illness, review of systems, past medical history, surgical history, family history, social history, medications, and allergy list.     Review of Systems     Constitutional: Denies fevers, chills, weight loss  Cardiovascular: Denies chest pain, shortness of breath  Skin: Denies rashes, acute skin changes  Neurologic: Denies headache, loss of consciousness  MSK: Right knee pain       Vital Signs:   /66   Pulse 58   Ht 177.8 cm (70\")   Wt 92.5 kg (204 lb)   SpO2 96%   BMI 29.27 kg/m²            Ortho Exam    Physical Exam  General:Alert. No acute distress   Right lower extremity: Positive EHL, FHL, GS and TA. Sensation intact to all 5 nerves of the foot. " Positive pulses. Tender to the medial knee. Positive Cosmo's. Negative Lachman's. Stable to varus/valgus stress. Stable to anterior/posterior drawer. Knee Extensor Mechanism  intact. ROM 0-120 degrees. Pain with extension. No effusion.     Right knee: R knee  Date/Time: 10/22/2024 8:16 AM  Consent given by: patient  Site marked: site marked  Timeout: Immediately prior to procedure a time out was called to verify the correct patient, procedure, equipment, support staff and site/side marked as required   Supporting Documentation  Indications: pain   Procedure Details  Location: knee - R knee  Needle gauge: 21 G.  Medications administered: 40 mg triamcinolone acetonide 40 MG/ML; 5 mL lidocaine 1 %  Patient tolerance: patient tolerated the procedure well with no immediate complications      This injection documentation was Scribed for Washington Bishop MD by Vidhi Renteria MA.  10/22/24   08:17 EDT       Imaging Results (Most Recent)       None             Result Review :       No results found.           Assessment and Plan     Diagnoses and all orders for this visit:    1. Primary osteoarthritis of right knee (Primary)    2. S/P right knee arthroscopic partial medial meniscectomy, chondroplasty, 11/13/2023.        The patient presents here today for a follow up for his right knee osteoarthritis.     Discussed the risks and benefits of a right knee steroid injection today in the office. Patient expressed understanding and wishes to proceed. He tolerated the injection well and without any complications.     Home exercises given today.     Call or return if worsening symptoms.    Follow Up     3 months     Patient was given instructions and counseling regarding his condition or for health maintenance advice. Please see specific information pulled into the AVS if appropriate.     Scribed for Washington Bishop MD by Marylu Bustamante.  10/22/24   08:04 EDT    I have personally performed the services described in  this document as scribed by the above individual and it is both accurate and complete. Washington Bishop MD 10/22/24

## 2024-11-01 ENCOUNTER — TELEPHONE (OUTPATIENT)
Dept: ORTHOPEDIC SURGERY | Facility: CLINIC | Age: 64
End: 2024-11-01
Payer: OTHER GOVERNMENT

## 2024-11-01 NOTE — TELEPHONE ENCOUNTER
APPT MADE:  3-29-25 AT 8:00 AM RT KNEE SYNVISC ONE WITH DR HOLM.  CALLED MRS PIPER REGARDING THE SYNVISC ONE APPROVAL.    LAST RT KNEE STEROID INJ:  10-22     =AUTH EXP [id number]

## 2024-11-01 NOTE — TELEPHONE ENCOUNTER
----- Message from Cheryl MARTI sent at 10/31/2024  3:01 PM EDT -----  Patient has been approved for Right Knee Synvisc One for dates:  10/23/24 to 04/27/25.  Okay to schedule when appropriate.  Auth #:  [id number]

## 2025-01-10 ENCOUNTER — OFFICE VISIT (OUTPATIENT)
Dept: ORTHOPEDIC SURGERY | Facility: CLINIC | Age: 65
End: 2025-01-10
Payer: OTHER GOVERNMENT

## 2025-01-10 VITALS — HEIGHT: 70 IN | BODY MASS INDEX: 29.2 KG/M2 | WEIGHT: 204 LBS

## 2025-01-10 DIAGNOSIS — M17.11 PRIMARY OSTEOARTHRITIS OF RIGHT KNEE: Primary | ICD-10-CM

## 2025-01-10 NOTE — PROGRESS NOTES
"Chief Complaint  Follow-up of the Right Knee       Subjective      Jay Muñiz presents to [facility] for a follow up for his right knee. He has been treating his right knee osteoarthritis conservatively. He was last seen in the office on 10/22/24 where he had a steroid injection. He reports this injection only gave him relief for about a week. His right knee gives out and meño on him. He denies any new injury or falls since his last visit. He locates his pain to the medial aspect of his knee. He underwent a right knee arthroscopy with partial medial menisectomy and chondroplasty performed on 23.     No Known Allergies     Social History     Socioeconomic History    Marital status:    Tobacco Use    Smoking status: Former     Current packs/day: 0.00     Average packs/day: 1.5 packs/day for 30.0 years (45.0 ttl pk-yrs)     Types: Cigarettes     Start date: 1976     Quit date: 2006     Years since quittin.1    Smokeless tobacco: Never   Vaping Use    Vaping status: Never Used   Substance and Sexual Activity    Alcohol use: Not Currently     Comment: Social Drinker, haven't drank in years    Drug use: Never    Sexual activity: Not Currently     Partners: Female        I reviewed the patient's chief complaint, history of present illness, review of systems, past medical history, surgical history, family history, social history, medications, and allergy list.     Review of Systems     Constitutional: Denies fevers, chills, weight loss  Cardiovascular: Denies chest pain, shortness of breath  Skin: Denies rashes, acute skin changes  Neurologic: Denies headache, loss of consciousness  MSK: right knee pain       Vital Signs:   Ht 177.8 cm (70\")   Wt 92.5 kg (204 lb)   BMI 29.27 kg/m²         Ortho Exam    Physical Exam  General:Alert. No acute distress     Right lower extremity: well healed surgical incision, full extension, flexion to 120 degrees, stable to " varus/valgus stress, stable to anterior/posterior drawer , negative  Lachman's, pain with Cosmo's, tender to the medial joint line, non tender to the lateral joint line, mild swelling, no effusion, positive  pulses, distal neurovascularly intact, sensation intact to the medial, radial and ulnar nerve.     Large Joint: R knee  Date/Time: 1/10/2025 9:32 AM  Consent given by: patient  Site marked: site marked  Timeout: Immediately prior to procedure a time out was called to verify the correct patient, procedure, equipment, support staff and site/side marked as required   Supporting Documentation  Indications: pain   Procedure Details  Location: knee - R knee  Preparation: Patient was prepped and draped in the usual sterile fashion  Needle gauge: 21 G.  Medications administered: 48 mg hylan 48 MG/6ML  Patient tolerance: patient tolerated the procedure well with no immediate complications    This injection documentation was Scribed for Washington Bishop MD by Oneyda Strange MA.  01/10/25   09:35 EST    Imaging Results (Most Recent)       None             Result Review :       No results found.           Assessment and Plan     Diagnoses and all orders for this visit:    1. Primary osteoarthritis of right knee (Primary)        The patient presents here today for a follow up for his right knee osteoarthritis.     Discussed operative treatment options regarding a right knee arthroplasty with rajan versus non operative treatment options regarding injections, medications and therapy.     Patient wishes to proceed with conservative treatment options.     Discussed the risks and benefits of a right knee Synvisc One injection today in the office. Patient expressed understanding and wishes to proceed. He tolerated the injection well and without any complications.     Home exercises given today and will continue current medications.     Call or return if worsening symptoms.    Follow Up     3 months     Patient was given  instructions and counseling regarding his condition or for health maintenance advice. Please see specific information pulled into the AVS if appropriate.     Scribed for Washington Bihsop MD by Marylu Bustamante.  01/10/25   09:15 EST    I have personally performed the services described in this document as scribed by the above individual and it is both accurate and complete. Washington Bishop MD 01/12/25

## 2025-03-13 ENCOUNTER — PREP FOR SURGERY (OUTPATIENT)
Dept: OTHER | Facility: HOSPITAL | Age: 65
End: 2025-03-13
Payer: OTHER GOVERNMENT

## 2025-03-13 ENCOUNTER — OFFICE VISIT (OUTPATIENT)
Dept: ORTHOPEDIC SURGERY | Facility: CLINIC | Age: 65
End: 2025-03-13
Payer: OTHER GOVERNMENT

## 2025-03-13 VITALS
HEART RATE: 55 BPM | HEIGHT: 70 IN | WEIGHT: 200.4 LBS | DIASTOLIC BLOOD PRESSURE: 76 MMHG | SYSTOLIC BLOOD PRESSURE: 118 MMHG | BODY MASS INDEX: 28.69 KG/M2 | OXYGEN SATURATION: 95 %

## 2025-03-13 DIAGNOSIS — M25.561 RIGHT KNEE PAIN, UNSPECIFIED CHRONICITY: Primary | ICD-10-CM

## 2025-03-13 DIAGNOSIS — M17.11 PRIMARY OSTEOARTHRITIS OF RIGHT KNEE: Primary | ICD-10-CM

## 2025-03-13 DIAGNOSIS — M17.11 PRIMARY OSTEOARTHRITIS OF RIGHT KNEE: ICD-10-CM

## 2025-03-13 RX ORDER — TRANEXAMIC ACID 10 MG/ML
1000 INJECTION, SOLUTION INTRAVENOUS ONCE
OUTPATIENT
Start: 2025-03-13 | End: 2025-03-13

## 2025-03-13 RX ADMIN — TRIAMCINOLONE ACETONIDE 40 MG: 40 INJECTION, SUSPENSION INTRA-ARTICULAR; INTRAMUSCULAR at 10:08

## 2025-03-13 RX ADMIN — LIDOCAINE HYDROCHLORIDE 5 ML: 10 INJECTION, SOLUTION INFILTRATION; PERINEURAL at 10:08

## 2025-03-13 NOTE — PROGRESS NOTES
"Chief Complaint  Follow-up of the Right Knee       Subjective      Jay Muñiz presents to [facility] for a follow up for his right knee. He has been treating his right knee osteoarthritis conservatively. He has been getting steroid and gel injections in the past without much relief. He states his last injection done on 01/10/25 only last him about two weeks. He denies any new injury or falls since his last visit. He underwent a right knee arthroscopy with partial medial menisectomy and chondroplasty performed on 23. He is interested in operative treatment options.     No Known Allergies     Social History     Socioeconomic History    Marital status:    Tobacco Use    Smoking status: Former     Current packs/day: 0.00     Average packs/day: 1.5 packs/day for 30.0 years (45.0 ttl pk-yrs)     Types: Cigarettes     Start date: 1976     Quit date: 2006     Years since quittin.3    Smokeless tobacco: Never   Vaping Use    Vaping status: Never Used   Substance and Sexual Activity    Alcohol use: Not Currently     Comment: Social Drinker, haven't drank in years    Drug use: Never    Sexual activity: Not Currently     Partners: Female        I reviewed the patient's chief complaint, history of present illness, review of systems, past medical history, surgical history, family history, social history, medications, and allergy list.     Review of Systems     Constitutional: Denies fevers, chills, weight loss  Cardiovascular: Denies chest pain, shortness of breath  Skin: Denies rashes, acute skin changes  Neurologic: Denies headache, loss of consciousness  MSK: right knee pain       Vital Signs:   /76   Pulse 55   Ht 177.8 cm (70\")   Wt 90.9 kg (200 lb 6.4 oz)   SpO2 95%   BMI 28.75 kg/m²            Ortho Exam    Physical Exam  General:Alert. No acute distress     Right lower extremity: full extension, flexion  to 130 degrees, stable to varus/valgus stress, " stable to anterior/posterior drawer , mild swelling, no effusion, negative  Lachman's and Cosmo's, crepitus with range of motion, tender to the medial joint line , non tender to the lateral joint line, calf soft, distal neurovascularly intact, positive  pulses, positive EHL, FHL, GS, and TA. Sensation intact to all 5 nerves of the foot.     Large Joint Arthrocentesis: R knee  Date/Time: 3/13/2025 10:08 AM  Consent given by: patient  Site marked: site marked  Timeout: Immediately prior to procedure a time out was called to verify the correct patient, procedure, equipment, support staff and site/side marked as required   Supporting Documentation  Indications: pain   Procedure Details  Location: knee - R knee  Preparation: Patient was prepped and draped in the usual sterile fashion  Needle gauge: 21 G.  Medications administered: 5 mL lidocaine 1 %; 40 mg triamcinolone acetonide 40 MG/ML  Patient tolerance: patient tolerated the procedure well with no immediate complications    This injection documentation was Scribed for Washington Bishop MD by VEGA Davis.  03/13/25   10:09 EDT   X-Ray Report:  Right knee X-Ray  Indication: Evaluation of right knee pain   AP/Lateral and Standing view(s)  Findings: no acute fracture or effusion, advanced degenerative changes to the medial  compartment  Prior studies available for comparison: yes       Imaging Results (Most Recent)       Procedure Component Value Units Date/Time    XR Knee 3 View Right [[id number]] Resulted: 03/13/25 0938     Updated: 03/13/25 0941             Result Review :       No results found.           Assessment and Plan     Diagnoses and all orders for this visit:    1. Right knee pain, unspecified chronicity (Primary)  -     XR Knee 3 View Right    2. Primary osteoarthritis of right knee      The patient presents here today for a follow up for his right knee osteoarthritis. X-rays were obtained in the office today and these were reviewed today.      Discussed operative treatment options regarding a right knee arthroplasty with rajan versus non operative treatment options regarding injections, oral medications and therapy.     Patient wishes to proceed with operative treatment options in July/August. Risks and benefits was discussed and reviewed with the patient today. Patient expressed understanding and wishes to proceed.     Discussed the risks and benefits of a right knee steroid injection today in the office. Patient expressed understanding and wishes to proceed. Patient tolerted the injection well and without any complications.     Discussed surgery., Discussed with patient the implant type being used during surgery and patient understands., Surgery pamphlet given., Call or return if worsening symptoms., DME order for a 3 in 1 given today due to patient will be confined to one room/level of the home that does not offer a toilet during postop recovery. , I am ordering the use of the Nice1 cold therapy machine for 60 days post-op as part of an opioid-sparing approach to help manage pain and edema.  I feel this is medically necessary for the best care for this patient.   , and Patient does not have any metal allergies.     Follow Up     2 weeks post-operatively      Will obtain X-Rays of right knee at next visit.     Patient was given instructions and counseling regarding his condition or for health maintenance advice. Please see specific information pulled into the AVS if appropriate.     Scribed for Washington Bishop MD by Marylu Bustamante.  03/13/25   09:56 EDT    I have personally performed the services described in this document as scribed by the above individual and it is both accurate and complete. Washington Bishop MD 03/15/25

## 2025-03-15 RX ORDER — TRIAMCINOLONE ACETONIDE 40 MG/ML
40 INJECTION, SUSPENSION INTRA-ARTICULAR; INTRAMUSCULAR
Status: COMPLETED | OUTPATIENT
Start: 2025-03-13 | End: 2025-03-13

## 2025-03-15 RX ORDER — LIDOCAINE HYDROCHLORIDE 10 MG/ML
5 INJECTION, SOLUTION INFILTRATION; PERINEURAL
Status: COMPLETED | OUTPATIENT
Start: 2025-03-13 | End: 2025-03-13

## 2025-06-04 DIAGNOSIS — Z96.651 AFTERCARE FOLLOWING RIGHT KNEE JOINT REPLACEMENT SURGERY: Primary | ICD-10-CM

## 2025-06-04 DIAGNOSIS — Z47.1 AFTERCARE FOLLOWING RIGHT KNEE JOINT REPLACEMENT SURGERY: Primary | ICD-10-CM

## 2025-07-07 ENCOUNTER — PRE-ADMISSION TESTING (OUTPATIENT)
Dept: PREADMISSION TESTING | Facility: HOSPITAL | Age: 65
End: 2025-07-07
Payer: OTHER GOVERNMENT

## 2025-07-07 VITALS
RESPIRATION RATE: 16 BRPM | OXYGEN SATURATION: 100 % | TEMPERATURE: 97.9 F | BODY MASS INDEX: 30.37 KG/M2 | HEIGHT: 69 IN | WEIGHT: 205.03 LBS | HEART RATE: 51 BPM

## 2025-07-07 DIAGNOSIS — M17.11 PRIMARY OSTEOARTHRITIS OF RIGHT KNEE: ICD-10-CM

## 2025-07-07 LAB
ALBUMIN SERPL-MCNC: 4.8 G/DL (ref 3.5–5.2)
ALBUMIN/GLOB SERPL: 2 G/DL
ALP SERPL-CCNC: 62 U/L (ref 39–117)
ALT SERPL W P-5'-P-CCNC: 27 U/L (ref 1–41)
ANION GAP SERPL CALCULATED.3IONS-SCNC: 11.5 MMOL/L (ref 5–15)
AST SERPL-CCNC: 35 U/L (ref 1–40)
BASOPHILS # BLD AUTO: 0.05 10*3/MM3 (ref 0–0.2)
BASOPHILS NFR BLD AUTO: 0.9 % (ref 0–1.5)
BILIRUB SERPL-MCNC: 0.4 MG/DL (ref 0–1.2)
BUN SERPL-MCNC: 19.8 MG/DL (ref 8–23)
BUN/CREAT SERPL: 21.3 (ref 7–25)
CALCIUM SPEC-SCNC: 9.8 MG/DL (ref 8.6–10.5)
CHLORIDE SERPL-SCNC: 104 MMOL/L (ref 98–107)
CO2 SERPL-SCNC: 25.5 MMOL/L (ref 22–29)
CREAT SERPL-MCNC: 0.93 MG/DL (ref 0.76–1.27)
DEPRECATED RDW RBC AUTO: 41 FL (ref 37–54)
EGFRCR SERPLBLD CKD-EPI 2021: 92.3 ML/MIN/1.73
EOSINOPHIL # BLD AUTO: 0.13 10*3/MM3 (ref 0–0.4)
EOSINOPHIL NFR BLD AUTO: 2.3 % (ref 0.3–6.2)
ERYTHROCYTE [DISTWIDTH] IN BLOOD BY AUTOMATED COUNT: 12.7 % (ref 12.3–15.4)
GLOBULIN UR ELPH-MCNC: 2.4 GM/DL
GLUCOSE SERPL-MCNC: 98 MG/DL (ref 65–99)
HBA1C MFR BLD: 5.4 % (ref 4.8–5.6)
HCT VFR BLD AUTO: 42.9 % (ref 37.5–51)
HGB BLD-MCNC: 14 G/DL (ref 13–17.7)
IMM GRANULOCYTES # BLD AUTO: 0.01 10*3/MM3 (ref 0–0.05)
IMM GRANULOCYTES NFR BLD AUTO: 0.2 % (ref 0–0.5)
LYMPHOCYTES # BLD AUTO: 1.37 10*3/MM3 (ref 0.7–3.1)
LYMPHOCYTES NFR BLD AUTO: 23.9 % (ref 19.6–45.3)
MCH RBC QN AUTO: 28.9 PG (ref 26.6–33)
MCHC RBC AUTO-ENTMCNC: 32.6 G/DL (ref 31.5–35.7)
MCV RBC AUTO: 88.5 FL (ref 79–97)
MONOCYTES # BLD AUTO: 0.52 10*3/MM3 (ref 0.1–0.9)
MONOCYTES NFR BLD AUTO: 9.1 % (ref 5–12)
NEUTROPHILS NFR BLD AUTO: 3.65 10*3/MM3 (ref 1.7–7)
NEUTROPHILS NFR BLD AUTO: 63.6 % (ref 42.7–76)
NRBC BLD AUTO-RTO: 0 /100 WBC (ref 0–0.2)
PLATELET # BLD AUTO: 143 10*3/MM3 (ref 140–450)
PMV BLD AUTO: 12.9 FL (ref 6–12)
POTASSIUM SERPL-SCNC: 4.5 MMOL/L (ref 3.5–5.2)
PROT SERPL-MCNC: 7.2 G/DL (ref 6–8.5)
QT INTERVAL: 421 MS
QTC INTERVAL: 374 MS
RBC # BLD AUTO: 4.85 10*6/MM3 (ref 4.14–5.8)
SODIUM SERPL-SCNC: 141 MMOL/L (ref 136–145)
WBC NRBC COR # BLD AUTO: 5.73 10*3/MM3 (ref 3.4–10.8)

## 2025-07-07 PROCEDURE — 36415 COLL VENOUS BLD VENIPUNCTURE: CPT

## 2025-07-07 PROCEDURE — 80053 COMPREHEN METABOLIC PANEL: CPT

## 2025-07-07 PROCEDURE — 83036 HEMOGLOBIN GLYCOSYLATED A1C: CPT

## 2025-07-07 PROCEDURE — 85025 COMPLETE CBC W/AUTO DIFF WBC: CPT

## 2025-07-07 PROCEDURE — 93005 ELECTROCARDIOGRAM TRACING: CPT

## 2025-07-07 NOTE — DISCHARGE INSTRUCTIONS
DO NOT BRING your medications to the hospital with you, UNLESS something has changed since your PRE-Admission Testing appointment.  Hold all vitamins, supplements, and NSAIDS (Non- steroidal anti-inflammatory meds) for one week prior to surgery (you MAY take Tylenol or Acetaminophen).  Use your inhalers/nebulizers as usual, the morning of your procedure. BRING YOUR INHALERS with you.   Make sure you have a ride home and have someone who will stay with you the day of your procedure after you go home.  If you have any questions, please call your Pre-Admission Testing Nurse, CARLOS EDUARDO  [phone] Per anesthesia request, do not smoke for 24 hours before your procedure or as instructed by your surgeon.   ARRIVAL TIME CALL THE DAY BEFORE:   [phone] IF YOU MISS THE CALL   [facility]      Clear Liquid Diet        Find out when you need to start a clear liquid diet.   Think of “clear liquids” as anything you could read a newspaper through. This includes things like water, broth, sports drinks, or tea WITHOUT any kind of milk or cream.           Once you are told to start a clear liquid diet, only drink these things until 2 hours before arrival to the hospital or when the hospital says to stop. Total volume limitation: 8 oz.       Clear liquids you CAN drink:   Water   Clear broth: beef, chicken, vegetable, or bone broth with nothing in it   Gatorade   Lemonade or Luke-aid   Soda   Tea, coffee (NO cream or honey)   Jell-O (without fruit)   Popsicles (without fruit or cream)   Italian ices   Juice without pulp: apple, white, grape   You may use salt, pepper, and sugar  NO RED LIQUID   DRINK A 20 OZ BOTTLE OF GATORADE THE DAY OF SURGERY. 3 HOURS PRIOR TO YOUR ARRIVAL     Do NOT drink:   Milk or cream   Soy milk, almond milk, coconut milk, or other non-dairy drinks and   creamers   Milkshakes or smoothies   Tomato juice   Orange juice   Grapefruit juice   Cream soups or any other than  broth         Clear Liquid Diet:  Do NOT eat any solid food.  Do NOT eat or suck on mints or candy.  Do NOT chew gum.  Do NOT drink thick liquids like milk or juice with pulp in it.  Do NOT add milk, cream, or anything like soy milk or almond milk to coffee or tea.       PREOPERATIVE (BEFORE SURGERY)              BATHING INSTRUCTIONS  Instructions:    You will need to shower 3 separate times utilizing the soap provided; at the times indicated   below:   7/7/25 MONDAY NIGHT  7/8/25 TUESDAY MORNING  7/8/25 TUESDAY NIGHT    Wash your hair and face with normal shampoo and soap, rinse it well before using the surgical soap.      In the shower, wet the skin completely with water from your neck to your feet. Apply the cleanser to your   body ONLY FROM THE NECK TO YOUR FEET.     Do NOT USE THE CLEANSER ON YOUR FACE, HEAD, OR GENITAL (PRIVATE) AREAS.   Keep it out of your eyes, ears, and mouth because of the risk of injury to those areas.      Scrub with a clean washcloth for each bath utilizing the soap provided from the top of your body to the   bottom starting at the neck area.      Pay close attention to your armpits, groin area, and the site of surgery.      Wash your body gently for 5 minutes. Stand outside the stream or turn off the water while scrubbing your   body. Do NOT wash with your regular soap after the surgical cleanser is used.      RINSE THE CLEANSER OFF COMPLETELY with plenty of water. Rinse the area again thoroughly.      Dry off with a clean towel. The surgical soap can cause dryness; however do NOT APPLY LOTION,   CREAM, POWDER, and/or DEODORANT AFTER SHOWERING.     Be sure to where clean clothes after showering.      Ensure CLEAN BED LINENS AFTER FIRST wash with the surgical soap.      NO PETS ALLOWED IN THE BED with you after utilizing the surgical soap.

## 2025-07-07 NOTE — SIGNIFICANT NOTE
Calcium check within 2 weeks before each Prolia   PT GOAL IS TO HAVE LESS PAIN,  PT TO USE [facility] IN [place].  PT'S SPOUSE TO BE SUPPORT WITH AFTERCARE.

## 2025-07-08 NOTE — H&P
Farnaz from lab stopped by. She reports that patient had already had a new tube had already been drawn. Order for CMP placed and released.   [facility]   HISTORY AND PHYSICAL    Patient Name: Jay Muñiz  : 1960  MRN: [id number]  Primary Care Physician:  Alicia Hightower APRN  Date of admission: (Not on file)    Subjective   Subjective     Chief Complaint: Right knee pain    History of Present Illness: The patient is a 64-year-old male with right knee pain.  He has failed conservative treatment and wishes to undergo right total knee arthroplasty.  He reports right knee pain and stiffness.  The symptoms are worse with activity.  He wishes to undergo operative treatment.    Review of Systems : Negative except for those mentioned in the history of present illness    Personal History     Past Medical History:   Diagnosis Date    Arthritis     JOEY KNEE    Carpal tunnel syndrome     both hands    Lung nodule 2021    Pericarditis      WAS PUT ON INDOCIN, WHILE PT WAS IN [employer],  REPORTS HAS NOT SEEN A CARDIOLOGIST SINCE.    Sleep apnea, obstructive     LOST WEIGHT NO LONGER USES MACHINE       Past Surgical History:   Procedure Laterality Date    CARPAL TUNNEL RELEASE      ELBOW PROCEDURE      EYE SURGERY  Sept/Oct 2020    KNEE ARTHROSCOPY Right 2023    Procedure: RIGHT KNEE ARTHROSCOPY WITH PARTIAL MEDIAL MENISCECTOMY AND CHONDROPLASTY;  Surgeon: Washington Bishop MD;  Location: [facility];  Service: Orthopedics;  Laterality: Right;    ROTATOR CUFF REPAIR Left     SHOULDER SURGERY Right        Family History: family history includes Arthritis in his father and mother; Asthma in his mother; Cancer in his father; Diabetes in his father. Otherwise pertinent FHx was reviewed and not pertinent to current issue.    Social History:  reports that he quit smoking about 18 years ago. His smoking use included cigarettes. He started smoking about 48 years ago. He has a 45 pack-year smoking history. He has never used smokeless tobacco. He reports that he does not currently use alcohol. He reports that he does not use drugs.    Home  Medications:  Calcium 1200, Cetirizine HCl, Diclofenac Sodium, Flax Seed Oil, Naproxen Sodium, Turmeric, ascorbic acid, cholecalciferol, finasteride, multivitamin with minerals, naproxen, rosuvastatin, and tamsulosin    Allergies:  No Known Allergies    Objective    Objective     Vitals:        Physical Exam  General: No apparent distress, alert and oriented x 3  HEENT: Normocephalic/atraumatic  Neck: Supple  cardioVascular: Regular heart rate  Chest: Unlabored breathing  Abdomen: Soft, nontender nondistended  Musculoskeletal: Tender to palpation right knee.  Reduced knee range of motion.  Stability intact.  Positive pulses.  Ambulates with antalgic gait.  Neurological: Grossly intact    Result Review    Result Review:  I have personally reviewed the results from the time of this admission to 7/8/2025 17:08 EDT and agree with these findings:  []  Laboratory list / accordion  []  Microbiology  [x]  Radiology  []  EKG/Telemetry   []  Cardiology/Vascular   []  Pathology  []  Old records  []  Other:  Most notable findings include: X-rays: Right knee osteoarthritis      Assessment & Plan   Assessment / Plan     Brief Patient Summary:  Jay Muñiz is a 64 y.o. male who has right knee osteoarthritis    Active Hospital Problems:  Active Hospital Problems    Diagnosis     **Primary osteoarthritis of right knee      Plan:   I discussed treatment options with the patient.  Operative versus nonoperative treatment was discussed.  Risks and benefits of surgery were discussed.  Informed consent was obtained and the patient wished to proceed with right total knee arthroplasty.    VTE Prophylaxis:  No VTE prophylaxis order currently exists.        CODE STATUS:       Admission Status:  I believe this patient meets outpatient status.    Washington Bishop MD

## 2025-07-09 ENCOUNTER — ANESTHESIA (OUTPATIENT)
Dept: PERIOP | Facility: HOSPITAL | Age: 65
End: 2025-07-09
Payer: OTHER GOVERNMENT

## 2025-07-09 ENCOUNTER — HOSPITAL ENCOUNTER (OUTPATIENT)
Facility: HOSPITAL | Age: 65
Discharge: HOME | End: 2025-07-10
Attending: ORTHOPAEDIC SURGERY | Admitting: ORTHOPAEDIC SURGERY
Payer: OTHER GOVERNMENT

## 2025-07-09 ENCOUNTER — ANESTHESIA EVENT CONVERTED (OUTPATIENT)
Dept: ANESTHESIOLOGY | Facility: HOSPITAL | Age: 65
End: 2025-07-09
Payer: OTHER GOVERNMENT

## 2025-07-09 ENCOUNTER — ANESTHESIA EVENT (OUTPATIENT)
Dept: PERIOP | Facility: HOSPITAL | Age: 65
End: 2025-07-09
Payer: OTHER GOVERNMENT

## 2025-07-09 ENCOUNTER — APPOINTMENT (OUTPATIENT)
Dept: GENERAL RADIOLOGY | Facility: HOSPITAL | Age: 65
End: 2025-07-09
Payer: OTHER GOVERNMENT

## 2025-07-09 DIAGNOSIS — M17.11 PRIMARY OSTEOARTHRITIS OF RIGHT KNEE: ICD-10-CM

## 2025-07-09 DIAGNOSIS — Z74.09 IMPAIRED MOBILITY AND ADLS: ICD-10-CM

## 2025-07-09 DIAGNOSIS — M17.11 PRIMARY LOCALIZED OSTEOARTHRITIS OF RIGHT KNEE: ICD-10-CM

## 2025-07-09 DIAGNOSIS — R26.2 DIFFICULTY IN WALKING: Primary | ICD-10-CM

## 2025-07-09 DIAGNOSIS — Z78.9 IMPAIRED MOBILITY AND ADLS: ICD-10-CM

## 2025-07-09 PROCEDURE — 25010000002 HYDROMORPHONE PER 4 MG: Performed by: ORTHOPAEDIC SURGERY

## 2025-07-09 PROCEDURE — 20985 CPTR-ASST DIR MS PX: CPT | Performed by: PHYSICIAN ASSISTANT

## 2025-07-09 PROCEDURE — 25010000002 GLYCOPYRROLATE 0.2 MG/ML SOLUTION: Performed by: ANESTHESIOLOGY

## 2025-07-09 PROCEDURE — 25810000003 SODIUM CHLORIDE 0.9 % SOLUTION: Performed by: ORTHOPAEDIC SURGERY

## 2025-07-09 PROCEDURE — 25010000002 FENTANYL CITRATE (PF) 50 MCG/ML SOLUTION: Performed by: ANESTHESIOLOGY

## 2025-07-09 PROCEDURE — 25010000002 CEFAZOLIN PER 500 MG: Performed by: ORTHOPAEDIC SURGERY

## 2025-07-09 PROCEDURE — 97161 PT EVAL LOW COMPLEX 20 MIN: CPT

## 2025-07-09 PROCEDURE — 94799 UNLISTED PULMONARY SVC/PX: CPT

## 2025-07-09 PROCEDURE — 25010000002 PROPOFOL 10 MG/ML EMULSION: Performed by: ANESTHESIOLOGY

## 2025-07-09 PROCEDURE — 25010000002 MIDAZOLAM PER 1MG: Performed by: ANESTHESIOLOGY

## 2025-07-09 PROCEDURE — 20985 CPTR-ASST DIR MS PX: CPT | Performed by: ORTHOPAEDIC SURGERY

## 2025-07-09 PROCEDURE — 97110 THERAPEUTIC EXERCISES: CPT

## 2025-07-09 PROCEDURE — 25010000002 KETOROLAC TROMETHAMINE PER 15 MG: Performed by: ORTHOPAEDIC SURGERY

## 2025-07-09 PROCEDURE — 25010000002 ONDANSETRON PER 1 MG: Performed by: ANESTHESIOLOGY

## 2025-07-09 PROCEDURE — 94761 N-INVAS EAR/PLS OXIMETRY MLT: CPT

## 2025-07-09 PROCEDURE — 99214 OFFICE O/P EST MOD 30 MIN: CPT | Performed by: PHYSICIAN ASSISTANT

## 2025-07-09 PROCEDURE — 73560 X-RAY EXAM OF KNEE 1 OR 2: CPT

## 2025-07-09 PROCEDURE — 25010000002 DEXAMETHASONE PER 1 MG: Performed by: ANESTHESIOLOGY

## 2025-07-09 PROCEDURE — 25010000002 ROPIVACAINE PER 1 MG: Performed by: ORTHOPAEDIC SURGERY

## 2025-07-09 PROCEDURE — 25010000002 EPINEPHRINE 1 MG/ML SOLUTION: Performed by: ORTHOPAEDIC SURGERY

## 2025-07-09 PROCEDURE — C1713 ANCHOR/SCREW BN/BN,TIS/BN: HCPCS | Performed by: ORTHOPAEDIC SURGERY

## 2025-07-09 PROCEDURE — 25010000002 SUGAMMADEX 200 MG/2ML SOLUTION: Performed by: ANESTHESIOLOGY

## 2025-07-09 PROCEDURE — C1776 JOINT DEVICE (IMPLANTABLE): HCPCS | Performed by: ORTHOPAEDIC SURGERY

## 2025-07-09 PROCEDURE — 25010000002 LIDOCAINE PF 2% 2 % SOLUTION: Performed by: ANESTHESIOLOGY

## 2025-07-09 PROCEDURE — 27447 TOTAL KNEE ARTHROPLASTY: CPT | Performed by: ORTHOPAEDIC SURGERY

## 2025-07-09 PROCEDURE — 27447 TOTAL KNEE ARTHROPLASTY: CPT | Performed by: PHYSICIAN ASSISTANT

## 2025-07-09 PROCEDURE — 97165 OT EVAL LOW COMPLEX 30 MIN: CPT

## 2025-07-09 PROCEDURE — 25810000003 LACTATED RINGERS PER 1000 ML: Performed by: ANESTHESIOLOGY

## 2025-07-09 DEVICE — ART/SRF KN PERSONA/VE MC EF 8TO11 10MM RT: Type: IMPLANTABLE DEVICE | Site: KNEE | Status: FUNCTIONAL

## 2025-07-09 DEVICE — SCRW HEX PERSONA FML 2.5X25MM PK/2: Type: IMPLANTABLE DEVICE | Site: KNEE | Status: FUNCTIONAL

## 2025-07-09 DEVICE — IMPLANTABLE DEVICE: Type: IMPLANTABLE DEVICE | Site: KNEE | Status: FUNCTIONAL

## 2025-07-09 DEVICE — CAP TOTL KN CMT PRIMARY W/ROSA: Type: IMPLANTABLE DEVICE | Site: KNEE | Status: FUNCTIONAL

## 2025-07-09 DEVICE — COMP FEM/KN PERSONA CR CMT COCR STD SZ11 RT: Type: IMPLANTABLE DEVICE | Site: KNEE | Status: FUNCTIONAL

## 2025-07-09 DEVICE — CMT BONE PALACOS R HI/VISC 1X40: Type: IMPLANTABLE DEVICE | Site: KNEE | Status: FUNCTIONAL

## 2025-07-09 RX ORDER — LIDOCAINE HYDROCHLORIDE 20 MG/ML
INJECTION, SOLUTION EPIDURAL; INFILTRATION; INTRACAUDAL; PERINEURAL AS NEEDED
Status: DISCONTINUED | OUTPATIENT
Start: 2025-07-09 | End: 2025-07-09 | Stop reason: SURG

## 2025-07-09 RX ORDER — BUPIVACAINE HYDROCHLORIDE AND EPINEPHRINE 5; 5 MG/ML; UG/ML
INJECTION, SOLUTION EPIDURAL; INTRACAUDAL; PERINEURAL
Status: COMPLETED
Start: 2025-07-09 | End: 2025-07-09

## 2025-07-09 RX ORDER — TRANEXAMIC ACID 10 MG/ML
1000 INJECTION, SOLUTION INTRAVENOUS ONCE
Status: COMPLETED | OUTPATIENT
Start: 2025-07-09 | End: 2025-07-09

## 2025-07-09 RX ORDER — GLYCOPYRROLATE 0.2 MG/ML
0.2 INJECTION INTRAMUSCULAR; INTRAVENOUS ONCE
Status: COMPLETED | OUTPATIENT
Start: 2025-07-09 | End: 2025-07-09

## 2025-07-09 RX ORDER — EPHEDRINE SULFATE 50 MG/ML
INJECTION INTRAVENOUS AS NEEDED
Status: DISCONTINUED | OUTPATIENT
Start: 2025-07-09 | End: 2025-07-09 | Stop reason: SURG

## 2025-07-09 RX ORDER — DEXAMETHASONE SODIUM PHOSPHATE 4 MG/ML
INJECTION, SOLUTION INTRA-ARTICULAR; INTRALESIONAL; INTRAMUSCULAR; INTRAVENOUS; SOFT TISSUE
Status: COMPLETED
Start: 2025-07-09 | End: 2025-07-09

## 2025-07-09 RX ORDER — BUPIVACAINE HYDROCHLORIDE AND EPINEPHRINE 5; 5 MG/ML; UG/ML
INJECTION, SOLUTION EPIDURAL; INTRACAUDAL; PERINEURAL
Status: COMPLETED | OUTPATIENT
Start: 2025-07-09 | End: 2025-07-09

## 2025-07-09 RX ORDER — OXYCODONE HYDROCHLORIDE 5 MG/1
5 TABLET ORAL
Refills: 0 | Status: DISCONTINUED | OUTPATIENT
Start: 2025-07-09 | End: 2025-07-09 | Stop reason: HOSPADM

## 2025-07-09 RX ORDER — ROCURONIUM BROMIDE 10 MG/ML
INJECTION, SOLUTION INTRAVENOUS AS NEEDED
Status: DISCONTINUED | OUTPATIENT
Start: 2025-07-09 | End: 2025-07-09 | Stop reason: SURG

## 2025-07-09 RX ORDER — HYDROCODONE BITARTRATE AND ACETAMINOPHEN 7.5; 325 MG/1; MG/1
2 TABLET ORAL EVERY 4 HOURS PRN
Status: DISCONTINUED | OUTPATIENT
Start: 2025-07-09 | End: 2025-07-10 | Stop reason: HOSPADM

## 2025-07-09 RX ORDER — ACETAMINOPHEN 500 MG
1000 TABLET ORAL EVERY 8 HOURS
Status: DISCONTINUED | OUTPATIENT
Start: 2025-07-09 | End: 2025-07-10 | Stop reason: HOSPADM

## 2025-07-09 RX ORDER — ONDANSETRON 2 MG/ML
4 INJECTION INTRAMUSCULAR; INTRAVENOUS ONCE AS NEEDED
Status: DISCONTINUED | OUTPATIENT
Start: 2025-07-09 | End: 2025-07-09 | Stop reason: HOSPADM

## 2025-07-09 RX ORDER — ONDANSETRON 4 MG/1
4 TABLET, ORALLY DISINTEGRATING ORAL EVERY 6 HOURS PRN
Status: DISCONTINUED | OUTPATIENT
Start: 2025-07-09 | End: 2025-07-10 | Stop reason: HOSPADM

## 2025-07-09 RX ORDER — SODIUM CHLORIDE, SODIUM LACTATE, POTASSIUM CHLORIDE, CALCIUM CHLORIDE 600; 310; 30; 20 MG/100ML; MG/100ML; MG/100ML; MG/100ML
20 INJECTION, SOLUTION INTRAVENOUS CONTINUOUS PRN
Status: DISCONTINUED | OUTPATIENT
Start: 2025-07-09 | End: 2025-07-09 | Stop reason: HOSPADM

## 2025-07-09 RX ORDER — FINASTERIDE 5 MG/1
5 TABLET, FILM COATED ORAL DAILY
Status: DISCONTINUED | OUTPATIENT
Start: 2025-07-10 | End: 2025-07-10 | Stop reason: HOSPADM

## 2025-07-09 RX ORDER — PROMETHAZINE HYDROCHLORIDE 25 MG/1
25 SUPPOSITORY RECTAL ONCE AS NEEDED
Status: DISCONTINUED | OUTPATIENT
Start: 2025-07-09 | End: 2025-07-09 | Stop reason: HOSPADM

## 2025-07-09 RX ORDER — DEXMEDETOMIDINE HYDROCHLORIDE 100 UG/ML
INJECTION, SOLUTION INTRAVENOUS
Status: COMPLETED
Start: 2025-07-09 | End: 2025-07-09

## 2025-07-09 RX ORDER — FENTANYL CITRATE 50 UG/ML
INJECTION, SOLUTION INTRAMUSCULAR; INTRAVENOUS AS NEEDED
Status: DISCONTINUED | OUTPATIENT
Start: 2025-07-09 | End: 2025-07-09 | Stop reason: SURG

## 2025-07-09 RX ORDER — KETOROLAC TROMETHAMINE 15 MG/ML
15 INJECTION, SOLUTION INTRAMUSCULAR; INTRAVENOUS EVERY 6 HOURS
Status: COMPLETED | OUTPATIENT
Start: 2025-07-09 | End: 2025-07-10

## 2025-07-09 RX ORDER — ROSUVASTATIN CALCIUM 20 MG/1
20 TABLET, COATED ORAL NIGHTLY
Status: DISCONTINUED | OUTPATIENT
Start: 2025-07-09 | End: 2025-07-10 | Stop reason: HOSPADM

## 2025-07-09 RX ORDER — DEXMEDETOMIDINE HYDROCHLORIDE 100 UG/ML
INJECTION, SOLUTION INTRAVENOUS AS NEEDED
Status: DISCONTINUED | OUTPATIENT
Start: 2025-07-09 | End: 2025-07-09 | Stop reason: SURG

## 2025-07-09 RX ORDER — MAGNESIUM HYDROXIDE 1200 MG/15ML
LIQUID ORAL AS NEEDED
Status: DISCONTINUED | OUTPATIENT
Start: 2025-07-09 | End: 2025-07-09 | Stop reason: HOSPADM

## 2025-07-09 RX ORDER — TAMSULOSIN HYDROCHLORIDE 0.4 MG/1
0.4 CAPSULE ORAL NIGHTLY
Status: DISCONTINUED | OUTPATIENT
Start: 2025-07-09 | End: 2025-07-10 | Stop reason: HOSPADM

## 2025-07-09 RX ORDER — ONDANSETRON 2 MG/ML
INJECTION INTRAMUSCULAR; INTRAVENOUS AS NEEDED
Status: DISCONTINUED | OUTPATIENT
Start: 2025-07-09 | End: 2025-07-09 | Stop reason: SURG

## 2025-07-09 RX ORDER — ACETAMINOPHEN 500 MG
1000 TABLET ORAL ONCE
Status: COMPLETED | OUTPATIENT
Start: 2025-07-09 | End: 2025-07-09

## 2025-07-09 RX ORDER — PROMETHAZINE HYDROCHLORIDE 12.5 MG/1
25 TABLET ORAL ONCE AS NEEDED
Status: DISCONTINUED | OUTPATIENT
Start: 2025-07-09 | End: 2025-07-09 | Stop reason: HOSPADM

## 2025-07-09 RX ORDER — MIDAZOLAM HYDROCHLORIDE 2 MG/2ML
2 INJECTION, SOLUTION INTRAMUSCULAR; INTRAVENOUS ONCE
Status: COMPLETED | OUTPATIENT
Start: 2025-07-09 | End: 2025-07-09

## 2025-07-09 RX ORDER — ONDANSETRON 2 MG/ML
4 INJECTION INTRAMUSCULAR; INTRAVENOUS EVERY 6 HOURS PRN
Status: DISCONTINUED | OUTPATIENT
Start: 2025-07-09 | End: 2025-07-10 | Stop reason: HOSPADM

## 2025-07-09 RX ORDER — HYDROCODONE BITARTRATE AND ACETAMINOPHEN 7.5; 325 MG/1; MG/1
1-2 TABLET ORAL EVERY 4 HOURS PRN
Qty: 40 TABLET | Refills: 0 | Status: SHIPPED | OUTPATIENT
Start: 2025-07-09 | End: 2025-07-14 | Stop reason: SDUPTHER

## 2025-07-09 RX ORDER — NALOXONE HCL 0.4 MG/ML
0.4 VIAL (ML) INJECTION
Status: DISCONTINUED | OUTPATIENT
Start: 2025-07-09 | End: 2025-07-10 | Stop reason: HOSPADM

## 2025-07-09 RX ORDER — PROPOFOL 10 MG/ML
VIAL (ML) INTRAVENOUS AS NEEDED
Status: DISCONTINUED | OUTPATIENT
Start: 2025-07-09 | End: 2025-07-09 | Stop reason: SURG

## 2025-07-09 RX ORDER — HYDROCODONE BITARTRATE AND ACETAMINOPHEN 7.5; 325 MG/1; MG/1
1 TABLET ORAL EVERY 4 HOURS PRN
Status: DISCONTINUED | OUTPATIENT
Start: 2025-07-09 | End: 2025-07-10 | Stop reason: HOSPADM

## 2025-07-09 RX ORDER — SODIUM CHLORIDE 9 MG/ML
100 INJECTION, SOLUTION INTRAVENOUS CONTINUOUS
Status: ACTIVE | OUTPATIENT
Start: 2025-07-09 | End: 2025-07-09

## 2025-07-09 RX ORDER — OXYCODONE HYDROCHLORIDE 5 MG/1
5 TABLET ORAL
Status: DISCONTINUED | OUTPATIENT
Start: 2025-07-09 | End: 2025-07-09 | Stop reason: HOSPADM

## 2025-07-09 RX ORDER — PROMETHAZINE HYDROCHLORIDE 25 MG/1
25 TABLET ORAL ONCE AS NEEDED
Status: DISCONTINUED | OUTPATIENT
Start: 2025-07-09 | End: 2025-07-09 | Stop reason: HOSPADM

## 2025-07-09 RX ORDER — KETAMINE HYDROCHLORIDE 10 MG/ML
INJECTION, SOLUTION INTRAMUSCULAR; INTRAVENOUS AS NEEDED
Status: DISCONTINUED | OUTPATIENT
Start: 2025-07-09 | End: 2025-07-09 | Stop reason: SURG

## 2025-07-09 RX ORDER — SENNOSIDES 8.6 MG
325 CAPSULE ORAL DAILY
Qty: 21 TABLET | Refills: 0 | Status: SHIPPED | OUTPATIENT
Start: 2025-07-17

## 2025-07-09 RX ORDER — DEXAMETHASONE SODIUM PHOSPHATE 4 MG/ML
INJECTION, SOLUTION INTRA-ARTICULAR; INTRALESIONAL; INTRAMUSCULAR; INTRAVENOUS; SOFT TISSUE
Status: COMPLETED | OUTPATIENT
Start: 2025-07-09 | End: 2025-07-09

## 2025-07-09 RX ADMIN — ROSUVASTATIN 20 MG: 20 TABLET, FILM COATED ORAL at 21:00

## 2025-07-09 RX ADMIN — SUGAMMADEX 200 MG: 100 INJECTION, SOLUTION INTRAVENOUS at 09:05

## 2025-07-09 RX ADMIN — KETOROLAC TROMETHAMINE 15 MG: 15 INJECTION INTRAMUSCULAR at 11:32

## 2025-07-09 RX ADMIN — MIDAZOLAM HYDROCHLORIDE 2 MG: 1 INJECTION, SOLUTION INTRAMUSCULAR; INTRAVENOUS at 06:51

## 2025-07-09 RX ADMIN — SODIUM CHLORIDE 2 G: 9 INJECTION, SOLUTION INTRAVENOUS at 07:42

## 2025-07-09 RX ADMIN — TRANEXAMIC ACID 1000 MG: 10 INJECTION, SOLUTION INTRAVENOUS at 07:09

## 2025-07-09 RX ADMIN — HYDROCODONE BITARTRATE AND ACETAMINOPHEN 1 TABLET: 7.5; 325 TABLET ORAL at 23:49

## 2025-07-09 RX ADMIN — ACETAMINOPHEN 1000 MG: 500 TABLET ORAL at 06:48

## 2025-07-09 RX ADMIN — FENTANYL CITRATE 50 MCG: 50 INJECTION, SOLUTION INTRAMUSCULAR; INTRAVENOUS at 07:41

## 2025-07-09 RX ADMIN — EPHEDRINE SULFATE 10 MG: 50 INJECTION INTRAVENOUS at 07:42

## 2025-07-09 RX ADMIN — CEFAZOLIN 2000 MG: 2 INJECTION, POWDER, FOR SOLUTION INTRAMUSCULAR; INTRAVENOUS at 16:38

## 2025-07-09 RX ADMIN — TRANEXAMIC ACID 1000 MG: 10 INJECTION, SOLUTION INTRAVENOUS at 08:49

## 2025-07-09 RX ADMIN — EPHEDRINE SULFATE 10 MG: 50 INJECTION INTRAVENOUS at 09:01

## 2025-07-09 RX ADMIN — SODIUM CHLORIDE, POTASSIUM CHLORIDE, SODIUM LACTATE AND CALCIUM CHLORIDE 20 ML/HR: 600; 310; 30; 20 INJECTION, SOLUTION INTRAVENOUS at 06:47

## 2025-07-09 RX ADMIN — OXYCODONE HYDROCHLORIDE 5 MG: 5 TABLET ORAL at 09:52

## 2025-07-09 RX ADMIN — GLYCOPYRROLATE 0.2 MG: 0.2 INJECTION INTRAMUSCULAR; INTRAVENOUS at 06:51

## 2025-07-09 RX ADMIN — SODIUM CHLORIDE 100 ML/HR: 9 INJECTION, SOLUTION INTRAVENOUS at 10:47

## 2025-07-09 RX ADMIN — CEFAZOLIN 2000 MG: 2 INJECTION, POWDER, FOR SOLUTION INTRAMUSCULAR; INTRAVENOUS at 23:50

## 2025-07-09 RX ADMIN — EPHEDRINE SULFATE 10 MG: 50 INJECTION INTRAVENOUS at 08:36

## 2025-07-09 RX ADMIN — FENTANYL CITRATE 50 MCG: 50 INJECTION, SOLUTION INTRAMUSCULAR; INTRAVENOUS at 08:03

## 2025-07-09 RX ADMIN — BUPIVACAINE HYDROCHLORIDE AND EPINEPHRINE BITARTRATE 40 ML: 5; .005 INJECTION, SOLUTION EPIDURAL; INTRACAUDAL; PERINEURAL at 06:59

## 2025-07-09 RX ADMIN — DEXAMETHASONE SODIUM PHOSPHATE 8 MG: 4 INJECTION, SOLUTION INTRAMUSCULAR; INTRAVENOUS at 06:59

## 2025-07-09 RX ADMIN — KETAMINE HYDROCHLORIDE 25 MG: 10 INJECTION INTRAMUSCULAR; INTRAVENOUS at 08:21

## 2025-07-09 RX ADMIN — LIDOCAINE HYDROCHLORIDE 80 MG: 20 INJECTION, SOLUTION EPIDURAL; INFILTRATION; INTRACAUDAL; PERINEURAL at 07:41

## 2025-07-09 RX ADMIN — ACETAMINOPHEN 1000 MG: 500 TABLET ORAL at 11:32

## 2025-07-09 RX ADMIN — ONDANSETRON 4 MG: 2 INJECTION, SOLUTION INTRAMUSCULAR; INTRAVENOUS at 08:51

## 2025-07-09 RX ADMIN — ROCURONIUM BROMIDE 50 MG: 10 INJECTION, SOLUTION INTRAVENOUS at 07:42

## 2025-07-09 RX ADMIN — DEXAMETHASONE SODIUM PHOSPHATE 4 MG: 4 INJECTION, SOLUTION INTRAMUSCULAR; INTRAVENOUS at 07:48

## 2025-07-09 RX ADMIN — TAMSULOSIN HYDROCHLORIDE 0.4 MG: 0.4 CAPSULE ORAL at 21:01

## 2025-07-09 RX ADMIN — KETOROLAC TROMETHAMINE 15 MG: 15 INJECTION INTRAMUSCULAR at 23:49

## 2025-07-09 RX ADMIN — KETOROLAC TROMETHAMINE 15 MG: 15 INJECTION INTRAMUSCULAR at 16:38

## 2025-07-09 RX ADMIN — KETAMINE HYDROCHLORIDE 25 MG: 10 INJECTION INTRAMUSCULAR; INTRAVENOUS at 08:16

## 2025-07-09 RX ADMIN — DEXMEDETOMIDINE 20 MCG: 100 INJECTION, SOLUTION INTRAVENOUS at 06:48

## 2025-07-09 RX ADMIN — PROPOFOL 100 MG: 10 INJECTION, EMULSION INTRAVENOUS at 07:42

## 2025-07-09 NOTE — THERAPY EVALUATION
Acute Care - Physical Therapy Initial Evaluation   Maged     Patient Name: Jay Muñiz  : 1960  MRN: [id number]  Today's Date: 2025     Admit date: 2025     Referring Physician: Washington Bishop MD     Surgery Date:2025   Procedure(s) (LRB):  RIGHT TOTAL KNEE ARTHROPLASTY WITH BUCK ROBOT: SURGICAL PROCEDURE TO RESURFACE DAMAGED RIGHT KNEE AND REPAIR WITH ARTIFICIAL PARTS (Right)        Visit Dx:     ICD-10-CM ICD-9-CM   1. Difficulty in walking  R26.2 719.7   2. Primary osteoarthritis of right knee  M17.11 715.16     Patient Active Problem List   Diagnosis    Atherosclerosis of coronary artery without angina pectoris    Abnormal chest CT    Lung nodule    Need for vaccination with 20-polyvalent pneumococcal conjugate vaccine    Primary osteoarthritis of right knee    Acute medial meniscus tear of right knee    Aftercare following surgery of RIGHT KNEE ARTHROSCOPY WITH PARTIAL MEDIAL MENISCECTOMY AND CHONDROPLASTY 23    Thyroid nodule    Primary localized osteoarthritis of right knee     Past Medical History:   Diagnosis Date    Arthritis     JOEY KNEE    Carpal tunnel syndrome     both hands    Lung nodule 2021    Pericarditis      WAS PUT ON INDOCIN, WHILE PT WAS IN [employer],  REPORTS HAS NOT SEEN A CARDIOLOGIST SINCE.    Sleep apnea, obstructive     LOST WEIGHT NO LONGER USES MACHINE     Past Surgical History:   Procedure Laterality Date    CARPAL TUNNEL RELEASE      ELBOW PROCEDURE      EYE SURGERY  Sept/Oct 2020    KNEE ARTHROSCOPY Right 2023    Procedure: RIGHT KNEE ARTHROSCOPY WITH PARTIAL MEDIAL MENISCECTOMY AND CHONDROPLASTY;  Surgeon: Washington Bishop MD;  Location: [facility] OR [facility];  Service: Orthopedics;  Laterality: Right;    ROTATOR CUFF REPAIR Left     SHOULDER SURGERY Right      PT Assessment (Last 12 Hours)       PT Evaluation and Treatment       Row Name 25 1011          Physical Therapy Time and Intention    Subjective Information  complains of;pain  -BILL     Document Type evaluation  -BILL     Mode of Treatment individual therapy;physical therapy  -BILL     Patient Effort good  -BILL     Symptoms Noted During/After Treatment none  -BILL       Row Name 07/09/25 1011          General Information    Patient Observations alert;cooperative;agree to therapy  -BILL     Prior Level of Function independent:;all household mobility;community mobility  -BILL     Equipment Currently Used at Home none  -BILL     Existing Precautions/Restrictions fall;weight bearing  -BILL     Barriers to Rehab none identified  -BILL       Row Name 07/09/25 1011          Living Environment    Current Living Arrangements home  -BILL     People in Home spouse  -BILL       Row Name 07/09/25 1011          Cognition    Orientation Status (Cognition) oriented x 3  -BILL       Row Name 07/09/25 1011          Range of Motion Comprehensive    General Range of Motion lower extremity range of motion deficits identified  R knee 5-70°  -BILL       Row Name 07/09/25 1011          Strength Comprehensive (MMT)    General Manual Muscle Testing (MMT) Assessment lower extremity strength deficits identified  RLE 3+/5  -BILL       Row Name 07/09/25 1011          Mobility    Extremity Weight-bearing Status right lower extremity  -BILL     Right Lower Extremity (Weight-bearing Status) weight-bearing as tolerated (WBAT)  -BILL       Row Name 07/09/25 1011          Bed Mobility    Bed Mobility bed mobility (all) activities;supine-sit  -BILL     All Activities, [place] (Bed Mobility) contact guard  -BILL     Supine-Sit [place] (Bed Mobility) contact guard  -BILL       Row Name 07/09/25 1011          Transfers    Transfers bed-chair transfer;sit-stand transfer  -BILL       Row Name 07/09/25 1011          Bed-Chair Transfer    Bed-Chair [place] (Transfers) contact guard  -BILL     Assistive Device (Bed-Chair Transfers) walker, front-wheeled  -BILL       Row Name 07/09/25 1011          Sit-Stand Transfer    Sit-Stand [place]  (Transfers) contact guard  -BILL     Assistive Device (Sit-Stand Transfers) walker, front-wheeled  -BILL       Row Name 07/09/25 1011          Gait/Stairs (Locomotion)    Gait/Stairs Locomotion gait/ambulation assistive device  -BILL     [place] Level (Gait) contact guard  -BILL     Assistive Device (Gait) walker, front-wheeled  -BILL     Patient was able to Ambulate yes  -BILL     Distance in Feet (Gait) 20  -BILL     Pattern (Gait) step-to  -BILL       Row Name 07/09/25 1011          Safety Issues/Impairments Affecting Functional Mobility    Impairments Affecting Function (Mobility) balance;pain;range of motion (ROM);strength  -BILL       Row Name 07/09/25 1011          Balance    Balance Assessment standing dynamic balance  -BILL     Dynamic Standing Balance contact guard  -BILL     Position/Device Used, Standing Balance walker, front-wheeled  -BILL       Row Name             [REMOVED] Wound 11/13/23 0953 Right anterior knee Incision    Wound - Properties Group Placement Date: 11/13/23  -AZ Placement Time: 0953  -AZ Side: Right  -AZ Orientation: anterior  -AZ Location: knee  -AZ Primary Wound Type: Incision  -AZ Wound Outcome: Healed  -MG Removal Date: 07/09/25  -MG Removal Time: 0634  -MG    Retired Wound - Properties Group Placement Date: 11/13/23  -AZ Placement Time: 0953  -AZ Side: Right  -AZ Orientation: anterior  -AZ Location: knee  -AZ Primary Wound Type: Incision  -AZ Wound Outcome: Healed  -MG Removal Date: 07/09/25  -MG Removal Time: 0634  -MG    Retired Wound - Properties Group Placement Date: 11/13/23  -AZ Placement Time: 0953  -AZ Side: Right  -AZ Orientation: anterior  -AZ Location: knee  -AZ Primary Wound Type: Incision  -AZ Removal Date: 07/09/25  -MG Removal Time: 0634  -MG Wound Outcome: Healed  -MG    Retired Wound - Properties Group Date first assessed: 11/13/23  -AZ Time first assessed: 0953  -AZ Side: Right  -AZ Location: knee  -AZ Primary Wound Type: Incision  -AZ Resolution Date: 07/09/25  -MG Resolution  Time: 0634  -MG Wound Outcome: Healed  -MG      Row Name             Wound 07/09/25 0826 Right anterior knee Surgical    Wound - Properties Group Placement Date: 07/09/25  -MP Placement Time: 0826 -MP Side: Right  -MP Orientation: anterior  -MP Location: knee  -MP Primary Wound Type: Surgical  -MP    Retired Wound - Properties Group Placement Date: 07/09/25  -MP Placement Time: 0826 -MP Side: Right  -MP Orientation: anterior  -MP Location: knee  -MP    Retired Wound - Properties Group Placement Date: 07/09/25  -MP Placement Time: 0826  -MP Side: Right  -MP Orientation: anterior  -MP Location: knee  -MP    Retired Wound - Properties Group Date first assessed: 07/09/25  -MP Time first assessed: 0826 -MP Side: Right  -MP Location: knee  -MP      Row Name 07/09/25 1011          Plan of Care Review    Plan of Care Reviewed With patient  -BILL     Outcome Evaluation Pt presents with decreased ROM, strength, transfers and ambulation.  Skilled PT services will be required to address these mobility deficits.  -BILL       Row Name 07/09/25 1011          Therapy Assessment/Plan (PT)    Patient/Family Therapy Goals Statement (PT) Walk without pain  -BILL     Rehab Potential (PT) good  -BILL     Criteria for Skilled Interventions Met (PT) skilled treatment is necessary  -BILL     Therapy Frequency (PT) 2 times/day  -BILL     Predicted Duration of Therapy Intervention (PT) 10 days  -BILL     Problem List (PT) problems related to;balance;mobility;range of motion (ROM);strength;pain  -BILL     Activity Limitations Related to Problem List (PT) unable to ambulate safely;unable to transfer safely  -BILL     Therapy Assessment/Plan (PT) Additional planned therapy interventions: Group therapy  -BILL       Row Name 07/09/25 1011          PT Evaluation Complexity    History, PT Evaluation Complexity no personal factors and/or comorbidities  -BILL     Examination of Body Systems (PT Eval Complexity) total of 4 or more elements  -BILL     Clinical Presentation  (PT Evaluation Complexity) stable  -BILL     Clinical Decision Making (PT Evaluation Complexity) low complexity  -BILL     Overall Complexity (PT Evaluation Complexity) low complexity  -BILL       Row Name 07/09/25 1011          Therapy Plan Review/Discharge Plan (PT)    Therapy Plan Review (PT) evaluation/treatment results reviewed;participants voiced agreement with care plan;participants included;patient  -BILL       Row Name 07/09/25 1011          Physical Therapy Goals    Transfer Goal Selection (PT) transfer, PT goal 1  -IBLL     Gait Training Goal Selection (PT) gait training, PT goal 1  -BILL     ROM Goal Selection (PT) ROM, PT goal 1  -BILL     Strength Goal Selection (PT) strength, PT goal 1  -BILL       Row Name 07/09/25 1011          Transfer Goal 1 (PT)    Activity/Assistive Device (Transfer Goal 1, PT) transfers, all  -BILL     [place] Level/Cues Needed (Transfer Goal 1, PT) independent  -BILL     Time Frame (Transfer Goal 1, PT) long term goal (LTG);10 days  -BILL       Row Name 07/09/25 1011          Gait Training Goal 1 (PT)    Activity/Assistive Device (Gait Training Goal 1, PT) gait (walking locomotion);walker, rolling  -BILL     [place] Level (Gait Training Goal 1, PT) independent  -BILL     Distance (Gait Training Goal 1, PT) 300  -BILL     Time Frame (Gait Training Goal 1, PT) long term goal (LTG);10 days  -BILL       Row Name 07/09/25 1011          ROM Goal 1 (PT)    ROM Goal 1 (PT) Pt will demonstrate knee ROM from 0-110° on the affected side.  -BILL     Time Frame (ROM Goal 1, PT) long-term goal (LTG);10 days  -BILL       Row Name 07/09/25 1011          Strength Goal 1 (PT)    Strength Goal 1 (PT) Pt will demonstrate knee extension strength of 5/5 on the affected side.  -BILL     Time Frame (Strength Goal 1, PT) long term goal (LTG);10 days  -BILL               User Key  (r) = Recorded By, (t) = Taken By, (c) = Cosigned By      Initials Name Provider Type    MG Waleska Kraus, RN Registered Nurse    Gisell Alcantar RN  Registered Nurse    Bill Kay PT Physical Therapist    Shira Santillan RN Registered Nurse                      PT Recommendation and Plan  Anticipated Discharge Disposition (PT): home with outpatient therapy services  Planned Therapy Interventions (PT): balance training, bed mobility training, gait training, home exercise program, stair training, ROM (range of motion), strengthening, stretching, transfer training  Therapy Frequency (PT): 2 times/day  Plan of Care Reviewed With: patient  Outcome Evaluation: Pt presents with decreased ROM, strength, transfers and ambulation.  Skilled PT services will be required to address these mobility deficits.   Outcome Measures       Row Name 07/09/25 1000             How much help from another person do you currently need...    Turning from your back to your side while in flat bed without using bedrails? 3  -BILL      Moving from lying on back to sitting on the side of a flat bed without bedrails? 3  -BILL      Moving to and from a bed to a chair (including a wheelchair)? 3  -BILL      Standing up from a chair using your arms (e.g., wheelchair, bedside chair)? 3  -BILL      Climbing 3-5 steps with a railing? 3  -BILL      To walk in hospital room? 3  -BILL      AM-PAC 6 Clicks Score (PT) 18  -BILL         Functional Assessment    Outcome Measure Options AM-PAC 6 Clicks Basic Mobility (PT)  -BILL                User Key  (r) = Recorded By, (t) = Taken By, (c) = Cosigned By      Initials Name Provider Type    Bill Kay, PT Physical Therapist                     Time Calculation:    PT Charges       Row Name 07/09/25 1037             Time Calculation    PT Received On 07/09/25  -BILL      PT Goal Re-Cert Due Date 07/18/25  -BILL         Untimed Charges    PT Eval/Re-eval Minutes 20  -BILL         Total Minutes    Untimed Charges Total Minutes 20  -BILL       Total Minutes 20  -BILL                User Key  (r) = Recorded By, (t) = Taken By, (c) = Cosigned By      Initials Name  Provider Type    BILL Bill Sosa, PT Physical Therapist                      PT G-Codes  Outcome Measure Options: AM-PAC 6 Clicks Basic Mobility (PT)  AM-PAC 6 Clicks Score (PT): 18    Bill Sosa, PT  7/9/2025

## 2025-07-09 NOTE — ANESTHESIA PROCEDURE NOTES
Peripheral Block      Patient reassessed immediately prior to procedure    Patient location during procedure: pre-op  Start time: 7/9/2025 6:52 AM  Stop time: 7/9/2025 6:59 AM  Reason for block: at surgeon's request and post-op pain management  Performed by  Anesthesiologist: Donal Caldwell MD  Preanesthetic Checklist  Completed: patient identified, IV checked, site marked, risks and benefits discussed, surgical consent, monitors and equipment checked, pre-op evaluation and timeout performed  Prep:  Pt Position: supine  Sterile barriers:alcohol skin prep, partial drape, cap, washed/disinfected hands, mask and gloves  Prep: ChloraPrep  Patient monitoring: blood pressure monitoring, continuous pulse oximetry and EKG  Procedure    Sedation: yes  Performed under: local infiltration  Guidance:ultrasound guided and nerve stimulator    ULTRASOUND INTERPRETATION.  Using ultrasound guidance a 20 G gauge needle was placed in close proximity to the nerve, at which point, under ultrasound guidance anesthetic was injected in the area of the nerve and spread of the anesthesia was seen on ultrasound in close proximity thereto.  There were no abnormalities seen on ultrasound; a digital image was taken; and the patient tolerated the procedure with no complications. Images:still images obtained, printed/placed on chart  Loss of twitch: 0.5 mA  Laterality:right  Block Type:adductor canal block  Injection Technique:single-shot  Needle Type:echogenic  Needle Gauge:20 G (4in)  Resistance on Injection: none    Medications Used: bupivacaine-EPINEPHrine PF (MARCAINE w/EPI) 0.5% -[id number] injection - Injection, Adductor Canal   40 mL - 7/9/2025 6:59:00 AM  dexamethasone (DECADRON) injection 4 mg/mL - Injection, Adductor Canal   8 mg - 7/9/2025 6:59:00 AM      Medications  Comment:Precedex 50mcg added to above solution mixture    Post Assessment  Injection Assessment: negative aspiration for heme, no paresthesia on injection and incremental  injection  Patient Tolerance:comfortable throughout block  Complications:no  Additional Notes  The block or continuous infusion is requested by the referring physician for management of postoperative pain, or pain related to a procedure. Ultrasound guidance (deemed medically necessary). Painless injection, pt was awake and conversant during the procedure without complications. Needle and surrounding structures visualized throughout procedure. No adverse reactions or complications seen during this period. Post-procedure image showed no signs of complication, and anatomy was consistent with an uncomplicated nerve blockade.  Performed by: Donal Caldwell MD

## 2025-07-09 NOTE — ANESTHESIA POSTPROCEDURE EVALUATION
Patient: Jay Muñiz    Procedure Summary       Date: 07/09/25 Room / Location: [facility]  / [facility]    Anesthesia Start: 0732 Anesthesia Stop: 0924    Procedure: RIGHT TOTAL KNEE ARTHROPLASTY WITH BUCK ROBOT: SURGICAL PROCEDURE TO RESURFACE DAMAGED RIGHT KNEE AND REPAIR WITH ARTIFICIAL PARTS (Right: Knee) Diagnosis:       Primary osteoarthritis of right knee      (Primary osteoarthritis of right knee [M17.11])    Surgeons: Washington Bishop MD Provider: Ghulam Anaya MD    Anesthesia Type: general with block ASA Status: 2            Anesthesia Type: general with block    Vitals  Vitals Value Taken Time   BP 91/29 07/09/25 10:23   Temp 36.1 °C (96.9 °F) 07/09/25 09:17   Pulse 49 07/09/25 10:24   Resp 15 07/09/25 10:17   SpO2 97 % 07/09/25 10:24   Vitals shown include unfiled device data.        Post Anesthesia Care and Evaluation    Patient location during evaluation: bedside  Patient participation: complete - patient participated  Level of consciousness: awake  Pain management: adequate    Airway patency: patent  PONV Status: none  Cardiovascular status: acceptable and stable (stable Sinus Rashawn 50's)  Respiratory status: acceptable  Hydration status: acceptable

## 2025-07-09 NOTE — THERAPY TREATMENT NOTE
Acute Care - Physical Therapy Treatment Note   Maged     Patient Name: Jay Muñiz  : 1960  MRN: [id number]  Today's Date: 2025      Visit Dx:     ICD-10-CM ICD-9-CM   1. Difficulty in walking  R26.2 719.7   2. Primary osteoarthritis of right knee  M17.11 715.16   3. Impaired mobility and ADLs  Z74.09 V49.89    Z78.9      Patient Active Problem List   Diagnosis    Atherosclerosis of coronary artery without angina pectoris    Abnormal chest CT    Lung nodule    Need for vaccination with 20-polyvalent pneumococcal conjugate vaccine    Primary osteoarthritis of right knee    Acute medial meniscus tear of right knee    Aftercare following surgery of RIGHT KNEE ARTHROSCOPY WITH PARTIAL MEDIAL MENISCECTOMY AND CHONDROPLASTY 23    Thyroid nodule    Primary localized osteoarthritis of right knee     Past Medical History:   Diagnosis Date    Arthritis     JOEY KNEE    Carpal tunnel syndrome     both hands    Lung nodule 2021    Pericarditis      WAS PUT ON INDOCIN, WHILE PT WAS IN [employer],  REPORTS HAS NOT SEEN A CARDIOLOGIST SINCE.    Sleep apnea, obstructive     LOST WEIGHT NO LONGER USES MACHINE     Past Surgical History:   Procedure Laterality Date    CARPAL TUNNEL RELEASE      ELBOW PROCEDURE      EYE SURGERY  Sept/Oct 2020    KNEE ARTHROSCOPY Right 2023    Procedure: RIGHT KNEE ARTHROSCOPY WITH PARTIAL MEDIAL MENISCECTOMY AND CHONDROPLASTY;  Surgeon: Washington Bishop MD;  Location: [facility] OR [facility] [place];  Service: Orthopedics;  Laterality: Right;    ROTATOR CUFF REPAIR Left     SHOULDER SURGERY Right      PT Assessment (Last 12 Hours)       PT Evaluation and Treatment       Row Name 25 1400 25 1011       Physical Therapy Time and Intention    Subjective Information complains of;pain (P)   -EB complains of;pain  -BILL    Document Type therapy note (daily note) (P)   -EB evaluation  -BILL    Mode of Treatment individual therapy;physical therapy (P)   -EB individual  therapy;physical therapy  -BILL    Patient Effort good (P)   -EB good  -BILL    Symptoms Noted During/After Treatment none (P)   -EB none  -BILL      Row Name 07/09/25 1400 07/09/25 1011       General Information    Patient Profile Reviewed yes (P)   -EB --    Patient Observations alert;cooperative;agree to therapy (P)   -EB alert;cooperative;agree to therapy  -BILL    Prior Level of Function -- independent:;all household mobility;community mobility  -BILL    Equipment Currently Used at Home -- none  -BILL    Existing Precautions/Restrictions -- fall;weight bearing  -BILL    Barriers to Rehab -- none identified  -BILL      Row Name 07/09/25 1011          Living Environment    Current Living Arrangements home  -BILL     People in Home spouse  -BILL       Row Name 07/09/25 1011          Cognition    Orientation Status (Cognition) oriented x 3  -BILL       Row Name 07/09/25 1011          Range of Motion Comprehensive    General Range of Motion lower extremity range of motion deficits identified  R knee 5-70°  -BILL       Row Name 07/09/25 1011          Strength Comprehensive (MMT)    General Manual Muscle Testing (MMT) Assessment lower extremity strength deficits identified  RLE 3+/5  -BILL       Row Name 07/09/25 1400 07/09/25 1011       Mobility    Extremity Weight-bearing Status right lower extremity (P)   -EB right lower extremity  -BILL    Right Lower Extremity (Weight-bearing Status) weight-bearing as tolerated (WBAT) (P)   -EB weight-bearing as tolerated (WBAT)  -BILL      Row Name 07/09/25 1400 07/09/25 1011       Bed Mobility    Bed Mobility -- bed mobility (all) activities;supine-sit  -BILL    All Activities, [place] (Bed Mobility) -- contact guard  -BILL    Supine-Sit [place] (Bed Mobility) -- contact guard  -BILL    Comment, (Bed Mobility) patient was seated upon arrival. (P)   -EB --      Row Name 07/09/25 1400 07/09/25 1011       Transfers    Transfers sit-stand transfer (P)   -EB bed-chair transfer;sit-stand transfer  -BILL     Maintains Weight-bearing Status (Transfers) able to maintain (P)   -EB --      Row Name 07/09/25 1400 07/09/25 1011       Bed-Chair Transfer    Bed-Chair [place] (Transfers) contact guard (P)   -EB contact guard  -BILL    Assistive Device (Bed-Chair Transfers) walker, front-wheeled (P)   -EB walker, front-wheeled  -BILL      Row Name 07/09/25 1400 07/09/25 1011       Sit-Stand Transfer    Sit-Stand [place] (Transfers) contact guard;1 person assist;verbal cues (P)   -EB contact guard  -BILL    Assistive Device (Sit-Stand Transfers) walker, front-wheeled (P)   -EB walker, front-wheeled  -BILL      Row Name 07/09/25 1400 07/09/25 1011       Gait/Stairs (Locomotion)    Gait/Stairs Locomotion gait/ambulation assistive device (P)   -EB gait/ambulation assistive device  -BILL    [place] Level (Gait) contact guard (P)   -EB contact guard  -BILL    Assistive Device (Gait) walker, front-wheeled (P)   -EB walker, front-wheeled  -BILL    Patient was able to Ambulate yes (P)   -EB yes  -BILL    Distance in Feet (Gait) 100 (P)   -EB 20  -BILL    Pattern (Gait) 3-point;step-to (P)   -EB step-to  -BILL    Maintains Weight-bearing Status (Gait) able to maintain (P)   -EB --    Negotiation (Stairs) number of steps;handrail location (P)   -EB --    [place] Level (Stairs) contact guard (P)   -EB --    Handrail Location (Stairs) both sides (P)   -EB --    Number of Steps (Stairs) 4 (P)   -EB --    Ascending Technique (Stairs) step-to-step (P)   -EB --    Descending Technique (Stairs) step-to-step (P)   -EB --      Row Name 07/09/25 1400 07/09/25 1011       Safety Issues/Impairments Affecting Functional Mobility    Safety Issues Affecting Function (Mobility) ability to follow commands (P)   -EB --    Impairments Affecting Function (Mobility) -- balance;pain;range of motion (ROM);strength  -BILL      Row Name 07/09/25 1400 07/09/25 1011       Balance    Balance Assessment standing dynamic balance (P)   -EB standing dynamic balance  -BILL     Dynamic Standing Balance contact guard (P)   -EB contact guard  -BILL    Position/Device Used, Standing Balance walker, front-wheeled (P)   -EB walker, front-wheeled  -BILL      Row Name 07/09/25 1400          Motor Skills    Therapeutic Exercise hip;knee;ankle (P)   2x10: ankle pumps, quad sets, glute sets, SAQ, LAQ, heel slides, SLRs  -EB       Row Name             [REMOVED] Wound 11/13/23 0953 Right anterior knee Incision    Wound - Properties Group Placement Date: 11/13/23  -AZ Placement Time: 0953  -AZ Side: Right  -AZ Orientation: anterior  -AZ Location: knee  -AZ Primary Wound Type: Incision  -AZ Wound Outcome: Healed  -MG Removal Date: 07/09/25  -MG Removal Time: 0634  -MG    Retired Wound - Properties Group Placement Date: 11/13/23  -AZ Placement Time: 0953  -AZ Side: Right  -AZ Orientation: anterior  -AZ Location: knee  -AZ Primary Wound Type: Incision  -AZ Wound Outcome: Healed  -MG Removal Date: 07/09/25  -MG Removal Time: 0634  -MG    Retired Wound - Properties Group Placement Date: 11/13/23  -AZ Placement Time: 0953  -AZ Side: Right  -AZ Orientation: anterior  -AZ Location: knee  -AZ Primary Wound Type: Incision  -AZ Removal Date: 07/09/25  -MG Removal Time: 0634  -MG Wound Outcome: Healed  -MG    Retired Wound - Properties Group Date first assessed: 11/13/23  -AZ Time first assessed: 0953  -AZ Side: Right  -AZ Location: knee  -AZ Primary Wound Type: Incision  -AZ Resolution Date: 07/09/25  -MG Resolution Time: 0634  -MG Wound Outcome: Healed  -MG      Row Name             Wound 07/09/25 0826 Right anterior knee Surgical    Wound - Properties Group Placement Date: 07/09/25  -MP Placement Time: 0826  -MP Side: Right  -MP Orientation: anterior  -MP Location: knee  -MP Primary Wound Type: Surgical  -MP    Retired Wound - Properties Group Placement Date: 07/09/25  -MP Placement Time: 0826  -MP Side: Right  -MP Orientation: anterior  -MP Location: knee  -MP    Retired Wound - Properties Group Placement Date:  07/09/25  -MP Placement Time: 0826 -MP Side: Right  -MP Orientation: anterior  -MP Location: knee  -MP    Retired Wound - Properties Group Date first assessed: 07/09/25  -MP Time first assessed: 0826  -MP Side: Right  -MP Location: knee  -MP      Row Name 07/09/25 1011          Plan of Care Review    Plan of Care Reviewed With patient  -BILL     Outcome Evaluation Pt presents with decreased ROM, strength, transfers and ambulation.  Skilled PT services will be required to address these mobility deficits.  -BILL       Row Name 07/09/25 1400          Positioning and Restraints    Pre-Treatment Position sitting in chair/recliner (P)   -EB     Post Treatment Position chair (P)   -EB       Row Name 07/09/25 1011          Therapy Assessment/Plan (PT)    Patient/Family Therapy Goals Statement (PT) Walk without pain  -BILL     Rehab Potential (PT) good  -BILL     Criteria for Skilled Interventions Met (PT) skilled treatment is necessary  -BILL     Therapy Frequency (PT) 2 times/day  -BILL     Predicted Duration of Therapy Intervention (PT) 10 days  -BILL     Problem List (PT) problems related to;balance;mobility;range of motion (ROM);strength;pain  -BILL     Activity Limitations Related to Problem List (PT) unable to ambulate safely;unable to transfer safely  -BILL     Therapy Assessment/Plan (PT) Additional planned therapy interventions: Group therapy  -BILL       Row Name 07/09/25 1011          PT Evaluation Complexity    History, PT Evaluation Complexity no personal factors and/or comorbidities  -BILL     Examination of Body Systems (PT Eval Complexity) total of 4 or more elements  -BILL     Clinical Presentation (PT Evaluation Complexity) stable  -BILL     Clinical Decision Making (PT Evaluation Complexity) low complexity  -BILL     Overall Complexity (PT Evaluation Complexity) low complexity  -BILL       Row Name 07/09/25 1400          Progress Summary (PT)    Daily Progress Summary (PT) Patient tolerated treatment well today. Patient was able to  ambulate further distance and perform stair nagivation. Patient completed therapeutic exercises in chair with mild complaints of diffculty. Continue with plan of care. (P)   -EB       Row Name 07/09/25 1011          Therapy Plan Review/Discharge Plan (PT)    Therapy Plan Review (PT) evaluation/treatment results reviewed;participants voiced agreement with care plan;participants included;patient  -BILL       Row Name 07/09/25 1011          Physical Therapy Goals    Transfer Goal Selection (PT) transfer, PT goal 1  -BILL     Gait Training Goal Selection (PT) gait training, PT goal 1  -BILL     ROM Goal Selection (PT) ROM, PT goal 1  -BILL     Strength Goal Selection (PT) strength, PT goal 1  -BILL       Row Name 07/09/25 1011          Transfer Goal 1 (PT)    Activity/Assistive Device (Transfer Goal 1, PT) transfers, all  -BILL     [place] Level/Cues Needed (Transfer Goal 1, PT) independent  -BILL     Time Frame (Transfer Goal 1, PT) long term goal (LTG);10 days  -BILL       Row Name 07/09/25 1011          Gait Training Goal 1 (PT)    Activity/Assistive Device (Gait Training Goal 1, PT) gait (walking locomotion);walker, rolling  -BILL     [place] Level (Gait Training Goal 1, PT) independent  -BILL     Distance (Gait Training Goal 1, PT) 300  -BILL     Time Frame (Gait Training Goal 1, PT) long term goal (LTG);10 days  -BILL       Row Name 07/09/25 1011          ROM Goal 1 (PT)    ROM Goal 1 (PT) Pt will demonstrate knee ROM from 0-110° on the affected side.  -BILL     Time Frame (ROM Goal 1, PT) long-term goal (LTG);10 days  -BILL       Row Name 07/09/25 1011          Strength Goal 1 (PT)    Strength Goal 1 (PT) Pt will demonstrate knee extension strength of 5/5 on the affected side.  -BILL     Time Frame (Strength Goal 1, PT) long term goal (LTG);10 days  -BILL               User Key  (r) = Recorded By, (t) = Taken By, (c) = Cosigned By      Initials Name Provider Type    Waleska Marie RN Registered Nurse    Gisell Alcantar RN  Registered Nurse    Bill Kay, PT Physical Therapist    Shira Santillan RN Registered Nurse    Jenny Darnell, PT Student PT Student                      PT Recommendation and Plan     Progress Summary (PT)  Daily Progress Summary (PT): (P) Patient tolerated treatment well today. Patient was able to ambulate further distance and perform stair nagivation. Patient completed therapeutic exercises in chair with mild complaints of diffculty. Continue with plan of care.   Outcome Measures       Row Name 07/09/25 1400 07/09/25 1000          How much help from another person do you currently need...    Turning from your back to your side while in flat bed without using bedrails? 3 (P)   -EB 3  -BILL     Moving from lying on back to sitting on the side of a flat bed without bedrails? 3 (P)   -EB 3  -BILL     Moving to and from a bed to a chair (including a wheelchair)? 3 (P)   -EB 3  -BILL     Standing up from a chair using your arms (e.g., wheelchair, bedside chair)? 3 (P)   -EB 3  -BILL     Climbing 3-5 steps with a railing? 3 (P)   -EB 3  -BILL     To walk in hospital room? 3 (P)   -EB 3  -BILL     AM-PAC 6 Clicks Score (PT) 18 (P)   -EB 18  -BILL        Functional Assessment    Outcome Measure Options AM-PAC 6 Clicks Basic Mobility (PT) (P)   -EB AM-PAC 6 Clicks Basic Mobility (PT)  -BILL               User Key  (r) = Recorded By, (t) = Taken By, (c) = Cosigned By      Initials Name Provider Type    Bill Kay, PT Physical Therapist    Jenny Darnell, PT Student PT Student                     Time Calculation:    PT Charges       Row Name 07/09/25 1407 07/09/25 1037          Time Calculation    PT Received On 07/09/25 (P)   -EB 07/09/25  -BILL     PT Goal Re-Cert Due Date -- 07/18/25  -BILL        Timed Charges    92485 - PT Therapeutic Exercise Minutes 12 (P)   -EB --     96785 - Gait Training Minutes  8 (P)   -EB --        Untimed Charges    PT Eval/Re-eval Minutes -- 20  -BILL        Total Minutes    Timed  Charges Total Minutes 20 (P)   -EB --     Untimed Charges Total Minutes -- 20  -BILL      Total Minutes 20 (P)   -EB 20  -BILL               User Key  (r) = Recorded By, (t) = Taken By, (c) = Cosigned By      Initials Name Provider Type    Bill Kay, PT Physical Therapist    EB Jenny Bar, PT Student PT Student                  Therapy Charges for Today       Code Description Service Date Service Provider Modifiers Qty    97259308293 HC PT THER PROC EA 15 MIN 7/9/2025 Jenny Bar, PT Student GP 1            PT G-Codes  Outcome Measure Options: (P) AM-PAC 6 Clicks Basic Mobility (PT)  AM-PAC 6 Clicks Score (PT): (P) 18  AM-PAC 6 Clicks Score (OT): 21    Jenny Bar, PT Student  7/9/2025

## 2025-07-09 NOTE — PLAN OF CARE
Goal Outcome Evaluation:  Plan of Care Reviewed With: patient           Outcome Evaluation: Patient alert and oriented. Ambulating with assistance and voiding without issue. Pain well controlled after surgery. Monitoring bradycardia.

## 2025-07-09 NOTE — PLAN OF CARE
Goal Outcome Evaluation:  Plan of Care Reviewed With: patient, spouse        Progress: no change  Outcome Evaluation: Patient presents with balance and endurance limitations that impact patient safety and independence with adls. The skills of a therapist are necessary in order to implement the following treatment plan.    Anticipated Discharge Disposition (OT): home with assist, home with outpatient therapy services

## 2025-07-09 NOTE — PLAN OF CARE
Goal Outcome Evaluation:  Plan of Care Reviewed With: patient           Outcome Evaluation: Pt presents with decreased ROM, strength, transfers and ambulation.  Skilled PT services will be required to address these mobility deficits.    Anticipated Discharge Disposition (PT): home with outpatient therapy services                         no redness/no discharge

## 2025-07-09 NOTE — CONSULTS
[facility]   HOSPITALIST HISTORY AND PHYSICAL  Date: 2025   Patient Name: Jay Muñiz  : 1960  MRN: [id number]  Primary Care Physician:  Alicia Hightower, AARON  Date of admission: 2025  Room / Bed:   235/1  Subjective   Subjective   Chief Complaint: Medical management    HPI: Jay Muñiz is a pleasant 64 y.o. male being seen by hospitalist for general medical management of chronic medical issues including sleep apnea (unable to tolerate CPAP), urinary retention, dyslipidemia, coronary calcification per CT.  Also has history of OA right knee and had his right knee replaced 25 by Dr. Bishop.  Recent labs include A1c 5.4, creatinine 0.9, potassium 4.5.  Hemoglobin 4.85.  TSH 0.734 (May 2024).  Current vital signs heart rate 45 bpm.  BP 90/53.  Respiration 16.  T97.5.  Sats 98% on 2 L    2025    Wife at bedside  Patient ambulating short distances in room.  Has already voided bladder.  Right knee pain reasonably controlled.  No nausea or vomiting.  Ate sandwich earlier today.  No chest pains palpitations lightheadedness.  No SOA.  Pertinent History   Past Medical History:    Active Ambulatory Problems     Diagnosis Date Noted    Atherosclerosis of coronary artery without angina pectoris 2023    Abnormal chest CT 2023    Lung nodule 2023    Need for vaccination with 20-polyvalent pneumococcal conjugate vaccine 2023    Primary osteoarthritis of right knee 2023    Acute medial meniscus tear of right knee 2023    Aftercare following surgery of RIGHT KNEE ARTHROSCOPY WITH PARTIAL MEDIAL MENISCECTOMY AND CHONDROPLASTY 23    Thyroid nodule 2024    Primary localized osteoarthritis of right knee 2025     Resolved Ambulatory Problems     Diagnosis Date Noted    No Resolved Ambulatory Problems     Past Medical History:   Diagnosis Date    Arthritis 2006    Carpal tunnel syndrome     Pericarditis     Sleep apnea, obstructive         Past Surgical History:    Past Surgical History:   Procedure Laterality Date    CARPAL TUNNEL RELEASE      ELBOW PROCEDURE      EYE SURGERY  Sept/Oct 2020    KNEE ARTHROSCOPY Right 2023    Procedure: RIGHT KNEE ARTHROSCOPY WITH PARTIAL MEDIAL MENISCECTOMY AND CHONDROPLASTY;  Surgeon: Washington Bishop MD;  [place] [facility] [place] [facility] [place]  Service: Orthopedics;  Laterality: Right;    ROTATOR CUFF REPAIR Left     SHOULDER SURGERY Right        Social History:   Former smoker.  Retired .  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  Lives with wife near [place]  Social History     Socioeconomic History    Marital status:    Tobacco Use    Smoking status: Former     Current packs/day: 0.00     Average packs/day: 1.5 packs/day for 30.0 years (45.0 ttl pk-yrs)     Types: Cigarettes     Start date: 1976     Quit date: 2006     Years since quittin.6    Smokeless tobacco: Never   Vaping Use    Vaping status: Never Used   Substance and Sexual Activity    Alcohol use: Not Currently     Comment: Social Drinker, haven't drank in years    Drug use: Never    Sexual activity: Not Currently     Partners: Female       Family History:     Family History   Problem Relation Age of Onset    Arthritis Mother     Asthma Mother     Arthritis Father     Cancer Father     Diabetes Father     Malig Hyperthermia Neg Hx        Home Medications (reported)  Current Outpatient Medications   Medication Instructions    ascorbic acid (VITAMIN C) 250 mg, Daily    Calcium Carbonate-Vit D-Min (Calcium 1200) 7283-0946 MG-UNIT chewable tablet     Cetirizine HCl 10 MG capsule     cholecalciferol (VITAMIN D3) 1,000 Units, Daily    Diclofenac Sodium (VOLTAREN) 4 g, 4 Times Daily PRN    finasteride (PROSCAR) 5 mg, Nightly    Flaxseed, Linseed, (Flax Seed Oil) 1000 MG capsule     multivitamin with minerals tablet tablet 1 tablet, Daily    naproxen (NAPROSYN) 500 mg    Naproxen Sodium (ALEVE PO) Take  by mouth. 500 mg  twice daily     rosuvastatin (CRESTOR) 20 mg, Nightly    tamsulosin (FLOMAX) 0.4 MG capsule 24 hr capsule 1 capsule, Nightly    TURMERIC PO Take  by mouth.       Allergies:  No Known Allergies    REVIEW OF SYSTEMS:  Right knee pain    Objective   Objective   Vitals:   Temp:  [96.9 °F (36.1 °C)-97.5 °F (36.4 °C)] 97 °F (36.1 °C)  Heart Rate:  [42-65] 42  Resp:  [14-20] 16  BP: ()/(50-76) 90/55  Flow (L/min) (Oxygen Therapy):  [2-8] 3  PHYSICAL EXAM   CON: WN. WD. NAD.   NECK:  No stridor.   RESP:  No wheezes. No crackles.  No work of breathing.   CV:  RRR. No murmur noted.  No edema.  GI:  Soft and nontender.   EXT: Right knee dressing intact  PSYCH:  Alert. Oriented. Calm mood.  NEURO:  No dysarthria or aphasia.   SKIN: No chronic venous stasis changes or varicosities.       Result Review    Result Review:  I have personally reviewed the results from the time of this admission to 7/9/2025 11:15 EDT and agree with these findings:  []  Laboratory  []  Microbiology []  Radiology []  Old records  []  EKG/Telemetry  []  Cardiolovascular  []  Pathology []  Other:       Results from last 7 days   Lab Units 07/07/25  1143   WBC 10*3/mm3 5.73   HEMOGLOBIN g/dL 14.0   HEMATOCRIT % 42.9   PLATELETS 10*3/mm3 143     Results from last 7 days   Lab Units 07/07/25  1143   SODIUM mmol/L 141   POTASSIUM mmol/L 4.5   CO2 mmol/L 25.5   CHLORIDE mmol/L 104   ANION GAP mmol/L 11.5   BUN mg/dL 19.8   CREATININE mg/dL 0.93   GLUCOSE mg/dL 98            Assessment & Plan   Assessment / Plan   Assessment:    Medical management  Asymptomatic bradycardia  Coronary calcification noted on CT imaging  FORD (unable to tolerate CPAP)  Dyslipidemia  Urinary retention/BPH  OA right knee  Right total knee replacement 7/9/25 by Dr. Bishop     Plan:    Gentle IV fluid hydration  Check TSH in the morning.  Check BMP in the morning.  Monitor BP trend.  BP soft after surgery.  .  Continuous pulse oximetry and end-tidal capnography.  Continue Flomax/Proscar.   Monitor for urinary retention.  Daily physical therapy after knee replacement recommended  DVT prophylaxis per orthopedist  Pain med Rx per orthopedic    VTE Prophylaxis:  Pharmacologic & mechanical VTE prophylaxis orders are present.      CODE STATUS:         Electronically signed by HECTOR Vogel, 07/09/25, 11:15 AM EDT.

## 2025-07-09 NOTE — ANESTHESIA PREPROCEDURE EVALUATION
" Anesthesia Evaluation     Patient summary reviewed and Nursing notes reviewed   no history of anesthetic complications:   NPO Solid Status: > 8 hours  NPO Liquid Status: > 2 hours           Airway   Mallampati: III  TM distance: >3 FB  Neck ROM: full  Possible difficult intubation  Dental      Pulmonary - normal exam    breath sounds clear to auscultation  (+) ,sleep apnea (resolved after losing weight)  Cardiovascular - normal exam  Exercise tolerance: good (4-7 METS)    ECG reviewed  Rhythm: regular  Rate: normal    (+) CAD (hx of pericarditis resolved in 1994 no problems since)      Neuro/Psych- negative ROS  GI/Hepatic/Renal/Endo      Musculoskeletal     Abdominal    Substance History - negative use     OB/GYN negative ob/gyn ROS         Other   arthritis,     ROS/Med Hx Other: HX OF BRADYCARDIA \"ALWAYS HAS BEEN LOW PER PER STATES WAS A RUNNER\"  PERICARDITIS 1994, [place]. METS>4.  ELM               Anesthesia Plan    ASA 2     general with block     (Patient understands anesthesia not responsible for dental damage.)  intravenous induction     Anesthetic plan, risks, benefits, and alternatives have been provided, discussed and informed consent has been obtained with: patient.    Use of blood products discussed with patient .    Plan discussed with CRNA.    CODE STATUS:         "

## 2025-07-09 NOTE — OP NOTE
TOTAL KNEE ARTHROPLASTY WITH BUCK ROBOT  Procedure Report    Patient Name:  Jay Muñiz  YOB: 1960    Date of Surgery:  7/9/2025     Indications:  The patient has had persistent knee pain and x-rays reveal advanced osteoarthritis.  They wish to proceed with operative treatment.  Risks and benefits of surgery were discussed.  Risk of bleeding, infection, damage to neurovascular structures, heart attack, stroke, DVT/PE, anesthesia complications including death, stiffness, instability, periprosthetic fracture, deep infection, among others were discussed.  Informed consent was obtained and they wish to proceed.      Pre-op Diagnosis:   Primary osteoarthritis of right knee [M17.11]       Post-Op Diagnosis Codes:     * Primary osteoarthritis of right knee [M17.11]    Procedure:  Procedure(s):  RIGHT TOTAL KNEE ARTHROPLASTY WITH BUCK ROBOT: SURGICAL PROCEDURE TO RESURFACE DAMAGED RIGHT KNEE AND REPAIR WITH ARTIFICIAL PARTS    Staff:  Surgeon(s):  Washington Bishop MD    Assistant: Christiano Remy PA    Surgical Approach: Knee Medial Parapatellar        Anesthesia: Choice    Estimated Blood Loss: 75 mL    Implants:    Implant Name Type Inv. Item Serial No.  Lot No. LRB No. Used Action   CMT BONE PALACOS R HI/VISC 1X40 - [id number] Implant CMT BONE PALACOS R HI/VISC 1X40  [facility] [id number] Right 2 Implanted   SCRW HEX PERSONA FML 2.5X25MM PK/2 - [id number] Implant SCRW HEX PERSONA FML 2.5X25MM PK/2  [employer] [id number] Right 1 Implanted   PAT KN PERSONA ALLPOLY CMT 35MM - [id number] Implant PAT KN PERSONA ALLPOLY CMT 35MM  [employer] [id number] Right 1 Implanted   COMP FEM/KN PERSONA CR CMT COCR STD SZ11 RT - [id number] Implant COMP FEM/KN PERSONA CR CMT COCR STD SZ11 RT  [employer] [id number] Right 1 Implanted   BASEPLT TIB/KN PERSONA KEEL CMT 0DEG SZF RT - [id number] Implant BASEPLT TIB/KN PERSONA KEEL CMT 0DEG SZF RT  [employer] [id number] Right 1 Implanted    ART/SRF KN PERSONA/VE  EF 8TO11 10MM RT - [id number] Implant ART/SRF KN PERSONA/VE  EF 8TO11 10MM RT  [employer] [id number] Right 1 Implanted       Specimen:          None        Findings: Knee osteoarthritis    Complications: None    Description of Procedure: The patient was brought to the operating room and placed supine on the OR table.  General anesthesia was given.  Preoperatively, the patient received an adductor canal nerve block. The patient was placed supine on the OR table.  All bony prominences were padded. The tourniquet was placed on the thigh. The affected lower extremity was prepped and draped in the usual sterile fashion. Preoperative antibiotic was given. Tranexamic acid intravenously was given at the beginning and the end of the surgery.  At this point, an Esmarch was used to exsanguinate the limb and the tourniquet was inflated. A longitudinal incision was made over the knee and a medial parapatellar arthrotomy was performed.  Appropriate releases were performed to the proximal medial tibia. The pre-patellar fat pad was excised.  The patella was subluxed laterally and the knee was examined. There was tricompartmental wear and osteophyte formation.  The medial and lateral menisci were removed.  Osteophytes of  the femur were debrided.     At this point 2 threaded guidepins were placed at the distal femur and the tracking sensor for the femur for the [place] robot were placed.  2 percutaneous stab incisions were made in the proximal tibial shaft and 2 guidepins were placed for the tibial tracking censor was placed.  We then registered the mechanical axis and femoral and tibial landmarks for the [place] Robot.  We then assessed the flexion extension gaps and the flexion and extension deformity.  We made our surgical plan and then executed the distal femoral cut, the 4-in-1 femoral cut and the tibial cut. The patella was then everted, resected, and sized.  Drill holes for the patella was made.  At  this point the spacer block was placed in flexion extension and fit well.     We then placed the knee in flexion where laminar spreaders were used to open the flexion gaps.  The menisci were removed.  Osteophytes were removed from the posterior femur. The posterior capsule was injected with anesthetic cocktail.  At this point, the appropriately sized tibial tray was chosen.  This was pinned into place in line with the medial one third of the tibial tubercle. We then placed the trial femur. The trial insert was placed and the knee was brought to full extension. It was stable to varus and valgus stress.   The knee was then trialed with range of motion again. The femoral drill holes were made. The tibia was finished with the drill and the punch.  The components were removed. The knee was irrigated and dried. The cement was mixed and the tibia and femur were cemented into place.  The medial congruent spacer was then inserted.  The patella was cemented into place. The knee was irrigated with Irrisept and normal saline Pulsavac lavage.  The medial congruent polyethylene was inserted. The knee was stable to varus and valgus stress. There was good balance to the ligaments medially and laterally. There was good flexion with a stable patella. At this point, the tourniquet was released.  There was minimal bleeding controlled with electrocautery.   The arthrotomy was closed with #1 Vicryl at 30 degrees of flexion, the subcutaneous tissues with 2-0 Vicryl, and the skin with staples.  The percutaneous stab incisions on the tibia were closed with 3-0 nylon in horizontal mattress fashion.  These incisions were covered with Xeroform, 4 x 4, Tegaderm.  An Aquacel dressing was placed and an Ace wrap was placed. Patient awoke from anesthesia in stable condition. There were no complications. All counts were correct.       Assistant: Christiano Remy PA  was responsible for performing the following activities: Retraction,  Suction, Irrigation, and Placing Dressing and their skilled assistance was necessary for the success of this case.    Washington Bishop MD     Date: 7/9/2025  Time: 09:20 EDT

## 2025-07-09 NOTE — THERAPY EVALUATION
Patient Name: Jay Muñiz  : 1960    MRN: [id number]                              Today's Date: 2025       Admit Date: 2025    Visit Dx:     ICD-10-CM ICD-9-CM   1. Difficulty in walking  R26.2 719.7   2. Primary osteoarthritis of right knee  M17.11 715.16   3. Impaired mobility and ADLs  Z74.09 V49.89    Z78.9      Patient Active Problem List   Diagnosis    Atherosclerosis of coronary artery without angina pectoris    Abnormal chest CT    Lung nodule    Need for vaccination with 20-polyvalent pneumococcal conjugate vaccine    Primary osteoarthritis of right knee    Acute medial meniscus tear of right knee    Aftercare following surgery of RIGHT KNEE ARTHROSCOPY WITH PARTIAL MEDIAL MENISCECTOMY AND CHONDROPLASTY 23    Thyroid nodule    Primary localized osteoarthritis of right knee     Past Medical History:   Diagnosis Date    Arthritis     JOEY KNEE    Carpal tunnel syndrome     both hands    Lung nodule 2021    Pericarditis      WAS PUT ON INDOCIN, WHILE PT WAS IN [employer],  REPORTS HAS NOT SEEN A CARDIOLOGIST SINCE.    Sleep apnea, obstructive     LOST WEIGHT NO LONGER USES MACHINE     Past Surgical History:   Procedure Laterality Date    CARPAL TUNNEL RELEASE      ELBOW PROCEDURE      EYE SURGERY  Sept/Oct 2020    KNEE ARTHROSCOPY Right 2023    Procedure: RIGHT KNEE ARTHROSCOPY WITH PARTIAL MEDIAL MENISCECTOMY AND CHONDROPLASTY;  Surgeon: Washington Bishop MD;  [place] [facility] [place] [facility] [place]  Service: Orthopedics;  Laterality: Right;    ROTATOR CUFF REPAIR Left     SHOULDER SURGERY Right       General Information       Row Name 25 1224          OT Time and Intention    Document Type evaluation  -AC     Mode of Treatment individual therapy;occupational therapy  -AC     Patient Effort good  -AC       Row Name 25 1224          General Information    Patient Profile Reviewed yes  -AC     Prior Level of Function --  patient reports (I) with adls and functional  mobility without device. patient stands in walkin shower to bathe with seat and regular toilet in place  -     Existing Precautions/Restrictions fall;weight bearing  -     Barriers to Rehab none identified  -       Row Name 07/09/25 1224          Occupational Profile    Reason for Services/Referral (Occupational Profile) Patient is 64 year old male admitted for the above diagnosis s/p right total knee replacement on 7/9/2025. Occupational therapy ordered to assess patient safety and independence with adls. patient not currently receiving OT services for the above diagnosis.  -       Row Name 07/09/25 1224          Living Environment    Current Living Arrangements home  -     People in Home spouse  -       Row Name 07/09/25 1224          Home Main Entrance    Number of Stairs, Main Entrance two  -       Row Name 07/09/25 1224          Cognition    Orientation Status (Cognition) oriented x 3  -       Row Name 07/09/25 1224          Safety Issues/Impairments Affecting Functional Mobility    Impairments Affecting Function (Mobility) balance;endurance/activity tolerance;pain  -               User Key  (r) = Recorded By, (t) = Taken By, (c) = Cosigned By      Initials Name Provider Type     Shirin Strange, OT Occupational Therapist                     Mobility/ADL's       Row Name 07/09/25 1227          Bed Mobility    Comment, (Bed Mobility) Patient seated in recliner upon OT arrival in the room.  -       Row Name 07/09/25 1227          Transfers    Transfers sit-stand transfer  -       Row Name 07/09/25 1227          Sit-Stand Transfer    Sit-Stand [place] (Transfers) contact guard;1 person assist;verbal cues  -     Assistive Device (Sit-Stand Transfers) walker, front-wheeled  -       Row Name 07/09/25 1227          Functional Mobility    Functional Mobility- Ind. Level contact guard assist;1 person;verbal cues required  -     Functional Mobility- Device walker, front-wheeled  -      Functional Mobility- Comment patient able to perform 2 steps forward and 2 steps back with cues for safety and technique with CGA and rolling walker.  -       Row Name 07/09/25 1227          Activities of Daily Living    BADL Assessment/Intervention --  Patient is setup for upper body bathing/dressing, setup for grooming, setup for self-feeding, min A for lower body bathing/dressing, CGA for toileting.  Patient donned underwear only with CGA as he had no other clothes with him.  -       Row Name 07/09/25 1227          Mobility    Extremity Weight-bearing Status right lower extremity  -     Right Lower Extremity (Weight-bearing Status) weight-bearing as tolerated (WBAT)  -               User Key  (r) = Recorded By, (t) = Taken By, (c) = Cosigned By      Initials Name Provider Type     Shirin Strange OT Occupational Therapist                   Obj/Interventions       Row Name 07/09/25 1230          Sensory Assessment (Somatosensory)    Sensory Assessment (Somatosensory) UE sensation intact  -[facility] Name 07/09/25 1230          Vision Assessment/Intervention    Visual Impairment/Limitations WFL  -[facility] Name 07/09/25 1230          Range of Motion Comprehensive    General Range of Motion bilateral upper extremity ROM WNL  -[facility] Name 07/09/25 1230          Strength Comprehensive (MMT)    General Manual Muscle Testing (MMT) Assessment no strength deficits identified  -[facility] Name 07/09/25 1230          Motor Skills    Motor Skills coordination;functional endurance  -     Coordination WNL  -     Functional Endurance fair for adls  -       Row Name 07/09/25 1230          Balance    Balance Assessment standing dynamic balance  -     Dynamic Standing Balance contact guard  -     Position/Device Used, Standing Balance supported;walker, front-wheeled  -     Balance Interventions standing;sit to stand;supported;minimal challenge;dynamic;occupation based/functional task  -                User Key  (r) = Recorded By, (t) = Taken By, (c) = Cosigned By      Initials Name Provider Type    AC Shirin Strange, OT Occupational Therapist                   Goals/Plan       Row Name 07/09/25 1233          Bed Mobility Goal 1 (OT)    Activity/Assistive Device (Bed Mobility Goal 1, OT) bed mobility activities, all  -AC     [place] Level/Cues Needed (Bed Mobility Goal 1, OT) modified independence  -AC     Time Frame (Bed Mobility Goal 1, OT) long term goal (LTG);10 days  -AC       Row Name 07/09/25 1233          Transfer Goal 1 (OT)    Activity/Assistive Device (Transfer Goal 1, OT) transfers, all  -AC     Time Frame (Transfer Goal 1, OT) long term goal (LTG);10 days  -AC       Row Name 07/09/25 1233          Bathing Goal 1 (OT)    Activity/Device (Bathing Goal 1, OT) bathing skills, all  -AC     [place] Level/Cues Needed (Bathing Goal 1, OT) modified independence  -AC     Time Frame (Bathing Goal 1, OT) long term goal (LTG);10 days  -AC       Row Name 07/09/25 1233          Dressing Goal 1 (OT)    Activity/Device (Dressing Goal 1, OT) dressing skills, all  -AC     [place]/Cues Needed (Dressing Goal 1, OT) modified independence  -AC     Time Frame (Dressing Goal 1, OT) long term goal (LTG);10 days  -AC       Row Name 07/09/25 1233          Toileting Goal 1 (OT)    Activity/Device (Toileting Goal 1, OT) toileting skills, all  -AC     [place] Level/Cues Needed (Toileting Goal 1, OT) modified independence  -AC     Time Frame (Toileting Goal 1, OT) long term goal (LTG);10 days  -AC       Row Name 07/09/25 1233          Problem Specific Goal 1 (OT)    Problem Specific Goal 1 (OT) Patient will improve endurance to good for adls.  -AC     Time Frame (Problem Specific Goal 1, OT) long term goal (LTG);10 days  -AC       Row Name 07/09/25 1233          Therapy Assessment/Plan (OT)    Planned Therapy Interventions (OT) activity tolerance training;functional balance retraining;occupation/activity  based interventions;patient/caregiver education/training;transfer/mobility retraining;ROM/therapeutic exercise;BADL retraining  -               User Key  (r) = Recorded By, (t) = Taken By, (c) = Cosigned By      Initials Name Provider Type    Shirin Cassidy OT Occupational Therapist                   Clinical Impression       Row Name 07/09/25 1231          Pain Assessment    Pretreatment Pain Rating 1/10  -     Posttreatment Pain Rating 1/10  -     Pain Location knee  -     Pain Side/Orientation right  -       Row Name 07/09/25 1231          Plan of Care Review    Plan of Care Reviewed With patient;spouse  -     Progress no change  -     Outcome Evaluation Patient presents with balance and endurance limitations that impact patient safety and independence with adls. The skills of a therapist are necessary in order to implement the following treatment plan.  -       Row Name 07/09/25 1231          Therapy Assessment/Plan (OT)    Patient/Family Therapy Goal Statement (OT) less pain.  -     Rehab Potential (OT) good  -     Criteria for Skilled Therapeutic Interventions Met (OT) yes;meets criteria;skilled treatment is necessary  -     Therapy Frequency (OT) 5 times/wk  -       Row Name 07/09/25 1231          Therapy Plan Review/Discharge Plan (OT)    Equipment Needs Upon Discharge (OT) walker, rolling;commode chair  -AC     Anticipated Discharge Disposition (OT) home with assist;home with outpatient therapy services  -       Row Name 07/09/25 1231          Positioning and Restraints    Pre-Treatment Position sitting in chair/recliner  -     Post Treatment Position chair  -AC     In Chair sitting;encouraged to call for assist;call light within reach;exit alarm on;with family/caregiver;legs elevated  -               User Key  (r) = Recorded By, (t) = Taken By, (c) = Cosigned By      Initials Name Provider Type    Shirin Cassidy OT Occupational Therapist                   Outcome  Measures       Row Name 07/09/25 1236          How much help from another is currently needed...    Putting on and taking off regular lower body clothing? 3  -AC     Bathing (including washing, rinsing, and drying) 3  -AC     Toileting (which includes using toilet bed pan or urinal) 3  -AC     Putting on and taking off regular upper body clothing 4  -AC     Taking care of personal grooming (such as brushing teeth) 4  -AC     Eating meals 4  -AC     AM-PAC 6 Clicks Score (OT) 21  -AC       Row Name 07/09/25 1132 07/09/25 1000       How much help from another person do you currently need...    Turning from your back to your side while in flat bed without using bedrails? 3  -DM 3  -BILL    Moving from lying on back to sitting on the side of a flat bed without bedrails? 3  -DM 3  -BILL    Moving to and from a bed to a chair (including a wheelchair)? 3  -DM 3  -BILL    Standing up from a chair using your arms (e.g., wheelchair, bedside chair)? 3  -DM 3  -BILL    Climbing 3-5 steps with a railing? 3  -DM 3  -BILL    To walk in hospital room? 3  -DM 3  -BILL    AM-PAC 6 Clicks Score (PT) 18  -DM 18  -BILL    Highest Level of Mobility Goal Walk 10 Steps or More-6  -DM Walk 10 Steps or More-6  -BILL      Row Name 07/09/25 1236 07/09/25 1000       Functional Assessment    Outcome Measure Options AM-PAC 6 Clicks Daily Activity (OT);Optimal Instrument  -AC AM-PAC 6 Clicks Basic Mobility (PT)  -BILL      Row Name 07/09/25 1236          Optimal Instrument    Optimal Instrument Optimal - 3  -AC     Bending/Stooping 2  -AC     Standing 2  -AC     Reaching 1  -AC     From the list, choose the 3 activities you would most like to be able to do without any difficulty Bending/stooping;Standing;Reaching  -AC     Total Score Optimal - 3 5  -AC               User Key  (r) = Recorded By, (t) = Taken By, (c) = Cosigned By      Initials Name Provider Type    AC Shirin Strange, OT Occupational Therapist    Bill Kay, PT Physical Therapist    DM  Adal Leon, RN Registered Nurse                    Occupational Therapy Education       Title: PT OT SLP Therapies (Done)       Topic: Occupational Therapy (Done)       Point: ADL training (Done)       Learning Progress Summary            Patient Acceptance, E,TB,D, DU,VU by  at 7/9/2025 1236   Family Acceptance, E,TB,D, DU,VU by  at 7/9/2025 1236                      Point: Home exercise program (Done)       Learning Progress Summary            Patient Acceptance, E,TB,D, DU,VU by  at 7/9/2025 1236   Family Acceptance, E,TB,D, DU,VU by  at 7/9/2025 1236                      Point: Precautions (Done)       Learning Progress Summary            Patient Acceptance, E,TB,D, DU,VU by  at 7/9/2025 1236   Family Acceptance, E,TB,D, DU,VU by  at 7/9/2025 1236                      Point: Body mechanics (Done)       Learning Progress Summary            Patient Acceptance, E,TB,D, DU,VU by  at 7/9/2025 1236   Family Acceptance, E,TB,D, DU,VU by  at 7/9/2025 1236                                      User Key       Initials Effective Dates Name Provider Type Discipline     06/16/21 -  Shirin Strange OT Occupational Therapist OT                  OT Recommendation and Plan  Planned Therapy Interventions (OT): activity tolerance training, functional balance retraining, occupation/activity based interventions, patient/caregiver education/training, transfer/mobility retraining, ROM/therapeutic exercise, BADL retraining  Therapy Frequency (OT): 5 times/wk  Plan of Care Review  Plan of Care Reviewed With: patient, spouse  Progress: no change  Outcome Evaluation: Patient presents with balance and endurance limitations that impact patient safety and independence with adls. The skills of a therapist are necessary in order to implement the following treatment plan.     Time Calculation:   Evaluation Complexity (OT)  Review Occupational Profile/Medical/Therapy History Complexity: expanded/moderate  complexity  Assessment, Occupational Performance/Identification of Deficit Complexity: 1-3 performance deficits  Clinical Decision Making Complexity (OT): problem focused assessment/low complexity  Overall Complexity of Evaluation (OT): low complexity     Time Calculation- OT       Row Name 07/09/25 1237             Time Calculation- OT    OT Received On 07/09/25  -AC      OT Goal Re-Cert Due Date 07/18/25  -AC         Untimed Charges    OT Eval/Re-eval Minutes 35  -AC         Total Minutes    Untimed Charges Total Minutes 35  -AC       Total Minutes 35  -AC                User Key  (r) = Recorded By, (t) = Taken By, (c) = Cosigned By      Initials Name Provider Type    AC Shirin Strange OT Occupational Therapist                  Therapy Charges for Today       Code Description Service Date Service Provider Modifiers Qty    [id number] HC OT EVAL LOW COMPLEXITY 3 7/9/2025 Shirin Strange OT GO 1                 Shirin Strange OT  7/9/2025

## 2025-07-10 VITALS
WEIGHT: 207.89 LBS | SYSTOLIC BLOOD PRESSURE: 112 MMHG | OXYGEN SATURATION: 99 % | HEIGHT: 69 IN | TEMPERATURE: 98.4 F | BODY MASS INDEX: 30.79 KG/M2 | RESPIRATION RATE: 16 BRPM | DIASTOLIC BLOOD PRESSURE: 69 MMHG | HEART RATE: 59 BPM

## 2025-07-10 LAB
ANION GAP SERPL CALCULATED.3IONS-SCNC: 9.1 MMOL/L (ref 5–15)
BUN SERPL-MCNC: 18.5 MG/DL (ref 8–23)
BUN/CREAT SERPL: 19.7 (ref 7–25)
CALCIUM SPEC-SCNC: 9.4 MG/DL (ref 8.6–10.5)
CHLORIDE SERPL-SCNC: 108 MMOL/L (ref 98–107)
CO2 SERPL-SCNC: 22.9 MMOL/L (ref 22–29)
CREAT SERPL-MCNC: 0.94 MG/DL (ref 0.76–1.27)
EGFRCR SERPLBLD CKD-EPI 2021: 91.1 ML/MIN/1.73
GLUCOSE SERPL-MCNC: 109 MG/DL (ref 65–99)
HCT VFR BLD AUTO: 35.8 % (ref 37.5–51)
HGB BLD-MCNC: 11.6 G/DL (ref 13–17.7)
POTASSIUM SERPL-SCNC: 4.6 MMOL/L (ref 3.5–5.2)
SODIUM SERPL-SCNC: 140 MMOL/L (ref 136–145)
TSH SERPL DL<=0.05 MIU/L-ACNC: 0.13 UIU/ML (ref 0.27–4.2)

## 2025-07-10 PROCEDURE — 85014 HEMATOCRIT: CPT | Performed by: ORTHOPAEDIC SURGERY

## 2025-07-10 PROCEDURE — 97535 SELF CARE MNGMENT TRAINING: CPT

## 2025-07-10 PROCEDURE — 97530 THERAPEUTIC ACTIVITIES: CPT

## 2025-07-10 PROCEDURE — 85018 HEMOGLOBIN: CPT | Performed by: ORTHOPAEDIC SURGERY

## 2025-07-10 PROCEDURE — 80048 BASIC METABOLIC PNL TOTAL CA: CPT | Performed by: PHYSICIAN ASSISTANT

## 2025-07-10 PROCEDURE — 99214 OFFICE O/P EST MOD 30 MIN: CPT | Performed by: PHYSICIAN ASSISTANT

## 2025-07-10 PROCEDURE — 25010000002 KETOROLAC TROMETHAMINE PER 15 MG: Performed by: ORTHOPAEDIC SURGERY

## 2025-07-10 PROCEDURE — 97150 GROUP THERAPEUTIC PROCEDURES: CPT

## 2025-07-10 PROCEDURE — 97116 GAIT TRAINING THERAPY: CPT

## 2025-07-10 PROCEDURE — 84443 ASSAY THYROID STIM HORMONE: CPT | Performed by: PHYSICIAN ASSISTANT

## 2025-07-10 PROCEDURE — 99024 POSTOP FOLLOW-UP VISIT: CPT | Performed by: STUDENT IN AN ORGANIZED HEALTH CARE EDUCATION/TRAINING PROGRAM

## 2025-07-10 RX ADMIN — KETOROLAC TROMETHAMINE 15 MG: 15 INJECTION INTRAMUSCULAR at 04:58

## 2025-07-10 RX ADMIN — FINASTERIDE 5 MG: 5 TABLET, FILM COATED ORAL at 09:03

## 2025-07-10 RX ADMIN — APIXABAN 2.5 MG: 2.5 TABLET, FILM COATED ORAL at 09:03

## 2025-07-10 RX ADMIN — HYDROCODONE BITARTRATE AND ACETAMINOPHEN 2 TABLET: 7.5; 325 TABLET ORAL at 13:24

## 2025-07-10 RX ADMIN — HYDROCODONE BITARTRATE AND ACETAMINOPHEN 1 TABLET: 7.5; 325 TABLET ORAL at 09:06

## 2025-07-10 RX ADMIN — HYDROCODONE BITARTRATE AND ACETAMINOPHEN 1 TABLET: 7.5; 325 TABLET ORAL at 05:03

## 2025-07-10 NOTE — PROGRESS NOTES
[facility]     Progress Note    Patient Name: Jay Muñiz  : 1960  MRN: [id number]  Primary Care Physician:  Alicia Hightower APRN  Date of admission: 2025    Subjective   Subjective     HPI:  No events overnight.  Pain well-controlled.  Up and ambulatory.  Denies chest pain or shortness of breath.    Review of Systems   See HPI    Objective   Objective     Vitals:   Temp:  [96.9 °F (36.1 °C)-98.8 °F (37.1 °C)] 98.1 °F (36.7 °C)  Heart Rate:  [42-67] 67  Resp:  [12-21] 21  BP: ()/(50-76) 120/62  Flow (L/min) (Oxygen Therapy):  [2-8] 2  Physical Exam    General: Alert, no acute distress   Right lower extremity: Aquacel intact.  No concerning drainage.  Swelling is mild.  Calf nontender.  Distal neurovascular intact.     Result Review      WBC   Date Value Ref Range Status   2025 5.73 3.40 - 10.80 10*3/mm3 Final     RBC   Date Value Ref Range Status   2025 4.85 4.14 - 5.80 10*6/mm3 Final     Hemoglobin   Date Value Ref Range Status   07/10/2025 11.6 (L) 13.0 - 17.7 g/dL Final     Hematocrit   Date Value Ref Range Status   07/10/2025 35.8 (L) 37.5 - 51.0 % Final     MCV   Date Value Ref Range Status   2025 88.5 79.0 - 97.0 fL Final     MCH   Date Value Ref Range Status   2025 28.9 26.6 - 33.0 pg Final     MCHC   Date Value Ref Range Status   2025 32.6 31.5 - 35.7 g/dL Final     RDW   Date Value Ref Range Status   2025 12.7 12.3 - 15.4 % Final     RDW-SD   Date Value Ref Range Status   2025 41.0 37.0 - 54.0 fl Final     MPV   Date Value Ref Range Status   2025 12.9 (H) 6.0 - 12.0 fL Final     Platelets   Date Value Ref Range Status   2025 143 140 - 450 10*3/mm3 Final     Neutrophil %   Date Value Ref Range Status   2025 63.6 42.7 - 76.0 % Final     Lymphocyte %   Date Value Ref Range Status   2025 23.9 19.6 - 45.3 % Final     Monocyte %   Date Value Ref Range Status   2025 9.1 5.0 - 12.0 % Final     Eosinophil %   Date  Value Ref Range Status   07/07/2025 2.3 0.3 - 6.2 % Final     Basophil %   Date Value Ref Range Status   07/07/2025 0.9 0.0 - 1.5 % Final     Immature Grans %   Date Value Ref Range Status   07/07/2025 0.2 0.0 - 0.5 % Final     Neutrophils, Absolute   Date Value Ref Range Status   07/07/2025 3.65 1.70 - 7.00 10*3/mm3 Final     Lymphocytes, Absolute   Date Value Ref Range Status   07/07/2025 1.37 0.70 - 3.10 10*3/mm3 Final     Monocytes, Absolute   Date Value Ref Range Status   07/07/2025 0.52 0.10 - 0.90 10*3/mm3 Final     Eosinophils, Absolute   Date Value Ref Range Status   07/07/2025 0.13 0.00 - 0.40 10*3/mm3 Final     Basophils, Absolute   Date Value Ref Range Status   07/07/2025 0.05 0.00 - 0.20 10*3/mm3 Final     Immature Grans, Absolute   Date Value Ref Range Status   07/07/2025 0.01 0.00 - 0.05 10*3/mm3 Final     nRBC   Date Value Ref Range Status   07/07/2025 0.0 0.0 - 0.2 /100 WBC Final        Result Review:  I have personally reviewed the results from the time of this admission to 7/10/2025 07:29 EDT and agree with these findings:  [x]  Laboratory  []  Microbiology  [x]  Radiology  []  EKG/Telemetry   []  Cardiology/Vascular   []  Pathology  []  Old records  []  Other:      Assessment & Plan   Assessment / Plan     Brief Patient Summary:  Jay Muñiz is a 64 y.o. male who is status post right total knee arthroplasty on 7/9    Active Hospital Problems:  Active Hospital Problems    Diagnosis     **Primary osteoarthritis of right knee     Primary localized osteoarthritis of right knee      Plan:   X-ray reviewed: No hardware complication  Pain control  Postoperative antibiotics  DVT prophylaxis  Weight-bear as tolerated  PT/OT  Further care per primary team, appreciate assistance  Dispo: Anticipate discharge home today, 2-week follow       VTE Prophylaxis:  Pharmacologic & mechanical VTE prophylaxis orders are present.        CODE STATUS:   Code Status (Patient has no pulse and is not breathing): CPR  (Attempt to Resuscitate)  Medical Interventions (Patient has pulse or is breathing): Full Support      Electronically signed by Valentín Bui MD, 07/10/25, 7:29 AM EDT.

## 2025-07-10 NOTE — PLAN OF CARE
Goal Outcome Evaluation:  Plan of Care Reviewed With: patient           Outcome Evaluation: Pt is A&O, on room air, and X 1 assist with walker and gait belt. All scheduled medications given as ordered and pain managed with PRN medications. VSS. Safety checks maintained throughout the shift with pt resting in chair, wheels to chair locked, and personal items and call light within reach. Pt voiding without difficulty. Educated pt on discharge instructions to include follow up appointments, pain management, incision care/dressing changes, signs/symptoms of infection, and if any questions or concerns arise to follow up with orthopedic office. Pt verbalized understanding. Pt participated in physical therapy and will follow up with  PT [place] on 7/11/25 at 11 am.

## 2025-07-10 NOTE — SIGNIFICANT NOTE
07/10/25 0748   Plan   Final Discharge Disposition Code 01 - home or self-care   Final Note  [facility] Appt: 07/11/25 at 11AM.

## 2025-07-10 NOTE — THERAPY TREATMENT NOTE
Patient Name: Jay Muñiz  : 1960    MRN: [id number]                              Today's Date: 7/10/2025       Admit Date: 2025    Visit Dx:     ICD-10-CM ICD-9-CM   1. Difficulty in walking  R26.2 719.7   2. Primary osteoarthritis of right knee  M17.11 715.16   3. Impaired mobility and ADLs  Z74.09 V49.89    Z78.9    4. Primary localized osteoarthritis of right knee  M17.11 715.16     Patient Active Problem List   Diagnosis    Atherosclerosis of coronary artery without angina pectoris    Abnormal chest CT    Lung nodule    Need for vaccination with 20-polyvalent pneumococcal conjugate vaccine    Primary osteoarthritis of right knee    Acute medial meniscus tear of right knee    Aftercare following surgery of RIGHT KNEE ARTHROSCOPY WITH PARTIAL MEDIAL MENISCECTOMY AND CHONDROPLASTY 23    Thyroid nodule    Primary localized osteoarthritis of right knee     Past Medical History:   Diagnosis Date    Arthritis     JOEY KNEE    Carpal tunnel syndrome     both hands    Lung nodule 2021    Pericarditis      WAS PUT ON INDOCIN, WHILE PT WAS IN [employer],  REPORTS HAS NOT SEEN A CARDIOLOGIST SINCE.    Sleep apnea, obstructive     LOST WEIGHT NO LONGER USES MACHINE     Past Surgical History:   Procedure Laterality Date    CARPAL TUNNEL RELEASE      ELBOW PROCEDURE      EYE SURGERY  Sept/Oct 2020    KNEE ARTHROSCOPY Right 2023    Procedure: RIGHT KNEE ARTHROSCOPY WITH PARTIAL MEDIAL MENISCECTOMY AND CHONDROPLASTY;  Surgeon: Washintgon Bishop MD;  [place] [facility] [place] [facility] [place]  Service: Orthopedics;  Laterality: Right;    ROTATOR CUFF REPAIR Left     SHOULDER SURGERY Right       General Information       Row Name 07/10/25 0810          OT Time and Intention    Document Type therapy note (daily note)  -PG     Mode of Treatment individual therapy;occupational therapy  -PG               User Key  (r) = Recorded By, (t) = Taken By, (c) = Cosigned By      Initials Name Provider Type    FRANK Mazariegos  JANINE Parnell Occupational Therapist                     Mobility/ADL's       Row Name 07/10/25 0810          Transfers    Transfers bed-chair transfer  -PG       Row Name 07/10/25 0810          Bed-Chair Transfer    Bed-Chair [place] (Transfers) contact guard  -PG     Assistive Device (Bed-Chair Transfers) walker, front-wheeled  -PG       Row Name 07/10/25 0810          Activities of Daily Living    BADL Assessment/Intervention bathing;upper body dressing;lower body dressing;grooming;toileting  -PG       Row Name 07/10/25 0810          Bathing Assessment/Intervention    [place] Level (Bathing) bathing skills;minimum assist (75% patient effort)  -PG     Position (Bathing) supported standing  -PG       Row Name 07/10/25 0810          Upper Body Dressing Assessment/Training    [place] Level (Upper Body Dressing) upper body dressing skills;set up  -PG       Row Name 07/10/25 0810          Lower Body Dressing Assessment/Training    [place] Level (Lower Body Dressing) lower body dressing skills;minimum assist (75% patient effort)  -PG     Position (Lower Body Dressing) supported standing  -PG       Row Name 07/10/25 0810          Grooming Assessment/Training    [place] Level (Grooming) grooming skills;contact guard assist  -PG     Position (Grooming) supported standing  -PG       Row Name 07/10/25 0810          Toileting Assessment/Training    [place] Level (Toileting) toileting skills;contact guard assist  -PG     Position (Toileting) supported standing  -PG               User Key  (r) = Recorded By, (t) = Taken By, (c) = Cosigned By      Initials Name Provider Type    PG Parmjit Mazariegos OT Occupational Therapist                   Obj/Interventions    No documentation.                  Goals/Plan    No documentation.                  Clinical Impression       Row Name 07/10/25 0812          Plan of Care Review    Progress no change  -PG               User Key  (r) = Recorded By, (t) = Taken By,  (c) = Cosigned By      Initials Name Provider Type    PG Parmjit Mazariegos, JANINE Occupational Therapist                   Outcome Measures       Row Name 07/10/25 0812          How much help from another is currently needed...    Putting on and taking off regular lower body clothing? 3  -PG     Bathing (including washing, rinsing, and drying) 3  -PG     Toileting (which includes using toilet bed pan or urinal) 3  -PG     Putting on and taking off regular upper body clothing 4  -PG     Taking care of personal grooming (such as brushing teeth) 4  -PG     Eating meals 4  -PG     AM-PAC 6 Clicks Score (OT) 21  -PG       Row Name 07/09/25 2110          How much help from another person do you currently need...    Turning from your back to your side while in flat bed without using bedrails? 3  -SV     Moving from lying on back to sitting on the side of a flat bed without bedrails? 3  -SV     Moving to and from a bed to a chair (including a wheelchair)? 3  -SV     Standing up from a chair using your arms (e.g., wheelchair, bedside chair)? 3  -SV     Climbing 3-5 steps with a railing? 3  -SV     To walk in hospital room? 3  -SV     AM-PAC 6 Clicks Score (PT) 18  -SV     Highest Level of Mobility Goal Walk 10 Steps or More-6  -SV       Row Name 07/10/25 0812          Functional Assessment    Outcome Measure Options AM-PAC 6 Clicks Daily Activity (OT);Optimal Instrument  -PG       Row Name 07/10/25 0812          Optimal Instrument    Optimal Instrument Optimal - 3  -PG     Bending/Stooping 2  -PG     Standing 2  -PG     Reaching 1  -PG               User Key  (r) = Recorded By, (t) = Taken By, (c) = Cosigned By      Initials Name Provider Type    Gill Linder, RN Registered Nurse    Parmjit Guajardo, JANINE Occupational Therapist                    Occupational Therapy Education       Title: PT OT SLP Therapies (Done)       Topic: Occupational Therapy (Done)       Point: ADL training (Done)       Learning Progress Summary             Patient Acceptance, E,TB,D, DU,VU by  at 7/9/2025 1236   Family Acceptance, E,TB,D, DU,VU by  at 7/9/2025 1236                      Point: Home exercise program (Done)       Learning Progress Summary            Patient Acceptance, E,TB,D, DU,VU by  at 7/9/2025 1236   Family Acceptance, E,TB,D, DU,VU by  at 7/9/2025 1236                      Point: Precautions (Done)       Learning Progress Summary            Patient Acceptance, E,TB,D, DU,VU by  at 7/9/2025 1236   Family Acceptance, E,TB,D, DU,VU by  at 7/9/2025 1236                      Point: Body mechanics (Done)       Learning Progress Summary            Patient Acceptance, E,TB,D, DU,VU by  at 7/9/2025 1236   Family Acceptance, E,TB,D, DU,VU by  at 7/9/2025 1236                                      User Key       Initials Effective Dates Name Provider Type [facility] 06/16/21 -  Shirin Strange OT Occupational Therapist OT                  OT Recommendation and Plan     Plan of Care Review  Progress: no change     Time Calculation:         Time Calculation- OT       Row Name 07/10/25 0814             Time Calculation- OT    OT Received On 07/10/25  -PG      OT Goal Re-Cert Due Date 07/18/25  -PG         Timed Charges    16219 - OT Therapeutic Activity Minutes 10  -PG      97601 - OT Self Care/Mgmt Minutes 15  -PG         Total Minutes    Timed Charges Total Minutes 25  -PG       Total Minutes 25  -PG                User Key  (r) = Recorded By, (t) = Taken By, (c) = Cosigned By      Initials Name Provider Type    PG Parmjit Mazariegos OT Occupational Therapist                  Therapy Charges for Today       Code Description Service Date Service Provider Modifiers Qty    38836887998 HC OT THERAPEUTIC ACT EA 15 MIN 7/10/2025 Parmjit Mazariegos OT GO 1    80747234259 HC OT SELF CARE/MGMT/TRAIN EA 15 MIN 7/10/2025 Parmjit Mazariegos OT GO 1                 Parmjit Mazariegos OT  7/10/2025

## 2025-07-10 NOTE — THERAPY TREATMENT NOTE
Acute Care - Physical Therapy Treatment Note   Maged     Patient Name: Jay Muñiz  : 1960  MRN: [id number]  Today's Date: 7/10/2025      Visit Dx:     ICD-10-CM ICD-9-CM   1. Difficulty in walking  R26.2 719.7   2. Primary osteoarthritis of right knee  M17.11 715.16   3. Impaired mobility and ADLs  Z74.09 V49.89    Z78.9    4. Primary localized osteoarthritis of right knee  M17.11 715.16     Patient Active Problem List   Diagnosis    Atherosclerosis of coronary artery without angina pectoris    Abnormal chest CT    Lung nodule    Need for vaccination with 20-polyvalent pneumococcal conjugate vaccine    Primary osteoarthritis of right knee    Acute medial meniscus tear of right knee    Aftercare following surgery of RIGHT KNEE ARTHROSCOPY WITH PARTIAL MEDIAL MENISCECTOMY AND CHONDROPLASTY 23    Thyroid nodule    Primary localized osteoarthritis of right knee     Past Medical History:   Diagnosis Date    Arthritis     JOEY KNEE    Carpal tunnel syndrome     both hands    Lung nodule 2021    Pericarditis      WAS PUT ON INDOCIN, WHILE PT WAS IN [employer],  REPORTS HAS NOT SEEN A CARDIOLOGIST SINCE.    Sleep apnea, obstructive     LOST WEIGHT NO LONGER USES MACHINE     Past Surgical History:   Procedure Laterality Date    CARPAL TUNNEL RELEASE      ELBOW PROCEDURE      EYE SURGERY  Sept/Oct 2020    KNEE ARTHROSCOPY Right 2023    Procedure: RIGHT KNEE ARTHROSCOPY WITH PARTIAL MEDIAL MENISCECTOMY AND CHONDROPLASTY;  Surgeon: Washington Bishop MD;  Location: [facility] [place] [facility] [place];  Service: Orthopedics;  Laterality: Right;    ROTATOR CUFF REPAIR Left     SHOULDER SURGERY Right     TOTAL KNEE ARTHROPLASTY Right 2025    Procedure: RIGHT TOTAL KNEE ARTHROPLASTY WITH BUCK ROBOT: SURGICAL PROCEDURE TO RESURFACE DAMAGED RIGHT KNEE AND REPAIR WITH ARTIFICIAL PARTS;  Surgeon: Washington Bishop MD;  Location: [facility] [place] [facility] [place];  Service: Robotics - Ortho;  Laterality: Right;     PT Assessment  (Last 12 Hours)       PT Evaluation and Treatment       [facility] Name 07/10/25 1127          Physical Therapy Time and Intention    Subjective Information complains of;pain  -SM     Document Type therapy note (daily note)  -     Mode of Treatment individual therapy;group therapy;physical therapy  -     Patient Effort good  -SM     Symptoms Noted During/After Treatment fatigue;increased pain  -SM     Comment Gait training performed individually; therapeutic exercises performed in a group setting with 5 participants  -[facility] Name 07/10/25 1127          General Information    Patient Observations alert;cooperative;agree to therapy  -SM       Row Name 07/10/25 [facility]7          Mobility    Extremity Weight-bearing Status right lower extremity  -     Right Lower Extremity (Weight-bearing Status) weight-bearing as tolerated (WBAT)  -[facility] Name 07/10/25 1127          Transfers    Transfers sit-stand transfer;stand-sit transfer;car transfer  -SM       Row Name 07/10/25 1127          Sit-Stand Transfer    Sit-Stand [place] (Transfers) contact guard  -     Assistive Device (Sit-Stand Transfers) walker, front-wheeled  -SM       Row Name 07/10/25 1127          Stand-Sit Transfer    Stand-Sit [place] (Transfers) contact guard  -     Assistive Device (Stand-Sit Transfers) walker, front-wheeled  -SM       Row Name 07/10/25 1127          Car Transfer    Type (Car Transfer) sit-stand;stand-sit  -     [place] Level (Car Transfer) contact guard  -     Assistive Device (Car Transfer) walker, front-wheeled  -SM       Row Name 07/10/25 1127          Gait/Stairs (Locomotion)    Gait/Stairs Locomotion gait/ambulation assistive device  -     [place] Level (Gait) contact guard  -     Assistive Device (Gait) walker, front-wheeled  -     Patient was able to Ambulate yes  -SM     Distance in Feet (Gait) 70  x2  -SM     Pattern (Gait) 3-point;step-through  -SM     Deviations/Abnormal Patterns (Gait)  antalgic;gait speed decreased;stride length decreased  -       Row Name 07/10/25 1127          Safety Issues/Impairments Affecting Functional Mobility    Impairments Affecting Function (Mobility) balance;pain;range of motion (ROM);strength  -[facility] Name 07/10/25 1127          Balance    Balance Assessment standing dynamic balance  -     Dynamic Standing Balance contact guard  -     Position/Device Used, Standing Balance walker, front-wheeled  -[facility] Name 07/10/25 1127          Motor Skills    Therapeutic Exercise knee;hip;ankle  -[facility] Name 07/10/25 1127          Hip (Therapeutic Exercise)    Hip (Therapeutic Exercise) isometric exercises  -     Hip Isometrics (Therapeutic Exercise) right;gluteal sets;10 repetitions;2 sets  -[facility] Name 07/10/25 1127          Knee (Therapeutic Exercise)    Knee (Therapeutic Exercise) isometric exercises;strengthening exercise  -     Knee Isometrics (Therapeutic Exercise) right;quad sets;10 repetitions;2 sets  -     Knee Strengthening (Therapeutic Exercise) right;heel slides;SLR (straight leg raise);SAQ (short arc quad);LAQ (long arc quad);10 repetitions;2 sets  -[facility] Name 07/10/25 1127          Ankle (Therapeutic Exercise)    Ankle (Therapeutic Exercise) AROM (active range of motion)  -     Ankle AROM (Therapeutic Exercise) right;dorsiflexion;plantarflexion;10 repetitions;2 sets  -[facility] Name             Wound 07/09/25 0826 Right anterior knee Surgical    Wound - Properties Group Placement Date: 07/09/25  -MP Placement Time: 0826 -MP Side: Right  -MP Orientation: anterior  -MP Location: knee  -MP Primary Wound Type: Surgical  -MP    Retired Wound - Properties Group Placement Date: 07/09/25  -MP Placement Time: 0826 -MP Side: Right  -MP Orientation: anterior  -MP Location: knee  -MP    Retired Wound - Properties Group Placement Date: 07/09/25  -MP Placement Time: 0826  -MP Side: Right  -MP Orientation: anterior  -MP Location:  knee  -MP    Retired Wound - Properties Group Date first assessed: 07/09/25  -MP Time first assessed: 0826 -MP Side: Right  -MP Location: knee  -MP      Row Name 07/10/25 1127          Positioning and Restraints    Pre-Treatment Position sitting in chair/recliner  -SM     Post Treatment Position chair  -SM     In Chair reclined;call light within reach;encouraged to call for assist;exit alarm on;legs elevated  -SM       Row Name 07/10/25 1127          Progress Summary (PT)    Progress Toward Functional Goals (PT) progress toward functional goals is good  -     Daily Progress Summary (PT) Pt is progressing well with their exercise program.  Will continue current plan of care.  -               User Key  (r) = Recorded By, (t) = Taken By, (c) = Cosigned By      Initials Name Provider Type    Cassidy Ly PTA Physical Therapist Assistant    Shira Santillan RN Registered Nurse                      PT Recommendation and Plan     Progress Summary (PT)  Progress Toward Functional Goals (PT): progress toward functional goals is good  Daily Progress Summary (PT): Pt is progressing well with their exercise program.  Will continue current plan of care.   Outcome Measures       Row Name 07/10/25 1128 07/09/25 1400 07/09/25 1000       How much help from another person do you currently need...    Turning from your back to your side while in flat bed without using bedrails? 4  - 3  -BILL (r) EB (t) BILL (c) 3  -BILL    Moving from lying on back to sitting on the side of a flat bed without bedrails? 4  - 3  -BILL (r) EB (t) BILL (c) 3  -BILL    Moving to and from a bed to a chair (including a wheelchair)? 4  - 3  -BILL (r) EB (t) BILL (c) 3  -BILL    Standing up from a chair using your arms (e.g., wheelchair, bedside chair)? 4  - 3  -BILL (r) EB (t) BILL (c) 3  -BILL    Climbing 3-5 steps with a railing? 4  - 3  -BILL (r) EB (t) BILL (c) 3  -BILL    To walk in hospital room? 4  - 3  -BILL (r) EB (t) BILL (c) 3  -BILL    AM-PAC 6 Clicks Score  (PT) 24  -SM 18  -BILL (r) EB (t) 18  -BILL       Functional Assessment    Outcome Measure Options -- AM-PAC 6 Clicks Basic Mobility (PT)  -BILL (r) EB (t) BILL (c) AM-PAC 6 Clicks Basic Mobility (PT)  -BILL              User Key  (r) = Recorded By, (t) = Taken By, (c) = Cosigned By      Initials Name Provider Type    Bill Kay, PT Physical Therapist    Cassidy Ly PTA Physical Therapist Assistant    Jenny Darnell, PT Student PT Student                     Time Calculation:    PT Charges       Row Name 07/10/25 1126             Time Calculation    PT Received On 07/10/25  -SM         Timed Charges    67806 - Gait Training Minutes  15  -SM         Untimed Charges    PT Group Therapy Minutes 30  -SM         Total Minutes    Timed Charges Total Minutes 15  -SM      Untimed Charges Total Minutes 30  -SM       Total Minutes 45  -SM                User Key  (r) = Recorded By, (t) = Taken By, (c) = Cosigned By      Initials Name Provider Type    Cassidy Ly PTA Physical Therapist Assistant                      PT G-Codes  Outcome Measure Options: AM-PAC 6 Clicks Daily Activity (OT), Optimal Instrument  AM-PAC 6 Clicks Score (PT): 24  AM-PAC 6 Clicks Score (OT): 21    Cassidy Castellanos PTA  7/10/2025

## 2025-07-10 NOTE — PROGRESS NOTES
[facility]   Hospitalist Progress Note       Patient Name: Jay Muñiz  : 1960  MRN: [id number]  Primary Care Physician: Alicia Hightower APRN  Date of admission: 2025  Today's Date: 7/10/2025  Room / Bed:   235/1  Subjective   Chief Complaint: Medical management     HPI:     Jay Muñiz is a pleasant 64 y.o. male being seen by hospitalist for general medical management of chronic medical issues including sleep apnea (unable to tolerate CPAP), urinary retention, dyslipidemia, coronary calcification per CT.  Also has history of OA right knee and had his right knee replaced 25 by Dr. Bishop.  Recent labs include A1c 5.4, creatinine 0.9, potassium 4.5.  Hemoglobin 4.85.  TSH 0.734 (May 2024).    Interval Followup: 7/10/2025    No overnight complaints.  No shortness of breath, cough, or wheezing.  On room air  No chest pain palpitations or dizziness.  /69  No issues voiding bladder  No nausea or vomiting.  Tolerating oral intake.  Right knee pain reasonably controlled  Morning creatinine 0.94  Room air  TSH 0.130      REVIEW OF SYSTEMS:   Right knee pain  Objective   Temp:  [97 °F (36.1 °C)-98.8 °F (37.1 °C)] 98.4 °F (36.9 °C)  Heart Rate:  [42-67] 59  Resp:  [12-21] 16  BP: ()/(53-76) 112/69  Flow (L/min) (Oxygen Therapy):  [2] 2  PHYSICAL EXAM   CON: WN. WD. NAD.   NECK:  No stridor.   RESP:  No wheezes. No crackles.  No work of breathing.   CV:  RRR. No murmur noted.  No edema.  GI:  Soft and nontender.    EXT: RLE intact neurovascularly  PSYCH:  Alert. Oriented. Calm mood.  NEURO:  No dysarthria or aphasia.   SKIN: No chronic venous stasis changes or varicosities.         Results from last 7 days   Lab Units 07/10/25  0537 25  1143   WBC 10*3/mm3  --  5.73   HEMOGLOBIN g/dL 11.6* 14.0   HEMATOCRIT % 35.8* 42.9   PLATELETS 10*3/mm3  --  143     Results from last 7 days   Lab Units 07/10/25  0537 25  1143   SODIUM mmol/L 140 141   POTASSIUM mmol/L 4.6 4.5   CO2  mmol/L 22.9 25.5   CHLORIDE mmol/L 108* 104   ANION GAP mmol/L 9.1 11.5   BUN mg/dL 18.5 19.8   CREATININE mg/dL 0.94 0.93   GLUCOSE mg/dL 109* 98         Assessment / Plan   Assessment:     Medical management  Asymptomatic bradycardia  Coronary calcification noted on CT imaging  FORD (unable to tolerate CPAP)  Dyslipidemia  Urinary retention/BPH  OA right knee  Right total knee replacement 7/9/25 by Dr. Bishop     Plan:     Resume home medical regimen including Flomax, Proscar, Crestor.    Discussed low TSH w patient.  [facility] and [facility] are aware and have been [facility]. Asked for him to see soon to discuss lab.  Daily physical therapy after knee replacement recommended  DVT prophylaxis per orthopedist  Pain med Rx per orthopedic  VTE Prophylaxis:  Pharmacologic & mechanical VTE prophylaxis orders are present.      CODE STATUS:      Code Status (Patient has no pulse and is not breathing): CPR (Attempt to Resuscitate)  Medical Interventions (Patient has pulse or is breathing): Full Support       Electronically signed by HECTOR Vogel, 07/10/25, 10:03 AM EDT.

## 2025-07-11 ENCOUNTER — TREATMENT (OUTPATIENT)
Dept: PHYSICAL THERAPY | Facility: CLINIC | Age: 65
End: 2025-07-11
Payer: OTHER GOVERNMENT

## 2025-07-11 DIAGNOSIS — R26.2 DIFFICULTY WALKING: ICD-10-CM

## 2025-07-11 DIAGNOSIS — M25.561 ACUTE POSTOPERATIVE PAIN OF RIGHT KNEE: ICD-10-CM

## 2025-07-11 DIAGNOSIS — G89.18 ACUTE POSTOPERATIVE PAIN OF RIGHT KNEE: ICD-10-CM

## 2025-07-11 DIAGNOSIS — Z96.651 S/P TOTAL KNEE ARTHROPLASTY, RIGHT: Primary | ICD-10-CM

## 2025-07-11 NOTE — PROGRESS NOTES
Physical Therapy Initial Evaluation and Plan of Care                       Patient: Jay Muñiz   : 1960  Diagnosis/ICD-10 Code:  S/P total knee arthroplasty, right [Z96.651]  Referring practitioner: Washington Bishop MD  Date of Initial Visit: 2025  Today's Date: 2025  Patient seen for 1 sessions           Subjective Questionnaire: LEFS: 3      Subjective Evaluation    History of Present Illness  Date of surgery: 2025  Mechanism of injury: Patient S/P right TKA performed 25. He reports 5-6 years of knee pain as well as a very active lifestyle contributing including lifting, sports, and yard work. He would like to return to camping and yard work.       Patient Occupation: retired- [employer]  Quality: pulling, sharp, tight and discomfort  Relieving factors: ice and medications  Aggravating factors: ambulation, squatting, prolonged positioning, standing, stairs, lifting and movement    Treatments  Discharged from (in last 30 days): inpatient hospitalization  Patient Goals  Patient goals for therapy: decreased edema, decreased pain, improved balance, increased motion, return to work, return to sport/leisure activities, independence with ADLs/IADLs and increased strength             Objective          Observations     Right Knee   Positive for edema and incision.     Additional Knee Observation Details  Minimal drainage on bandage  No signs of DVT or infection    Active Range of Motion     Right Knee   Flexion: 86 degrees with pain  Extensor la degrees with pain    Passive Range of Motion     Right Knee   Flexion: 101 degrees with pain  Extension: Right knee passive extension: lacking 3degrees. with pain    Patellar Mobility     Right Knee Hypomobile in the medial, lateral, superior and inferior patellar tendon(s).     Strength/Myotome Testing     Right Knee   Flexion: 3  Extension: 3  Quadriceps contraction: fair    Ambulation   Weight-Bearing Status   Assistive device used:  front-wheeled walker    Observational Gait   Gait: antalgic and asymmetric   Decreased walking speed, stride length and right stance time.       See Exercise, Manual, and Modality Logs for complete treatment.     Assessment & Plan       Assessment  Impairments: abnormal gait, abnormal muscle firing, abnormal or restricted ROM, activity intolerance, impaired balance, impaired physical strength, lacks appropriate home exercise program, pain with function, safety issue and weight-bearing intolerance   Functional limitations: walking, uncomfortable because of pain, moving in bed, standing and stooping   Assessment details: The patient presents to physical therapy with complaints of right knee pain s/p right TKA on 7/9/25. The patient presents with associated right knee weakness, stiffness, antalgic gait, and functional deficits (LEFS). The patient would benefit from skilled PT intervention to address the above mentioned functional limitations to allow the patient to return to his prior level of function.   Prognosis: good    Goals  Plan Goals: KNEE PROBLEMS    1. The patient has limited ROM of the right knee.   LTG 1:12 weeks:  The patient will demonstrate 0 to 120 degrees of ROM for the right knee in order to allow patient to ambulate and perform mobility related ADLs.    STATUS:  New   STG 1a: 6 weeks:  The patient will demonstrate 5 to 105 degrees of ROM for the right knee.    STATUS:  New    2. The patient has limited strength of the right knee.   LTG 2: 12 weeks: The patient will demonstrate 5 /5 strength for right knee flexion and extension in order to allow patient improved joint stability    STATUS:  New   STG 2a: 6 weeks: The patient will demonstrate 4+ /5 strength for right knee flexion and extension    STATUS:  New      3. The patient has gait dysfunction.   LTG 3: 12 weeks:  The patient will ambulate without assistive device, independently, for community distances with normal biomechanics of the right lower  extremity in order to improve mobility and allow patient to perform activities such as grocery shopping with greater ease.    STATUS:  New   STG 3a: 4 weeks: The patient will ambulate with a single tipped cane with appropriate gait mechanics.    STATUS:  New    4. Mobility: Walking/Moving Around Functional Limitation     LTG 4: 12 weeks:  The patient will demonstrate 25 % limitation by achieving a score of 60/80 on the LEFS.    STATUS:  New   STG 4 a: 6 weeks:  The patient will demonstrate 50 % limitation by achieving a score of 40/80 on the LEFS.      STATUS:  New     Plan  Therapy options: will be seen for skilled therapy services  Planned modality interventions: cryotherapy, electrical stimulation/Russian stimulation and TENS  Planned therapy interventions: balance/weight-bearing training, ADL retraining, soft tissue mobilization, strengthening, stretching, therapeutic activities, joint mobilization, home exercise program, gait training, functional ROM exercises, flexibility, body mechanics training, postural training, neuromuscular re-education and manual therapy  Frequency: 3x week  Duration in weeks: 12  Treatment plan discussed with: patient      Visit Diagnoses:    ICD-10-CM ICD-9-CM   1. S/P total knee arthroplasty, right  Z96.651 V43.65   2. Acute postoperative pain of right knee  G89.18 719.46    M25.561 338.18   3. Difficulty walking  R26.2 719.7       History # of Personal Factors and/or Comorbidities: LOW (0)  Examination of Body System(s): # of elements: LOW (1-2)  Clinical Presentation: STABLE   Clinical Decision Making: LOW       Timed:         Manual Therapy:    10     mins  35796;     Therapeutic Exercise:    10     mins  74103;     Neuromuscular Aleshia:    0    mins  99786;    Therapeutic Activity:     0     mins  96350;     Gait Training:      10     mins  39711;     Ultrasound:     0     mins  98649;    Ionto                               0    mins   62592  Self Care                       25      mins   90428  Canalith Repos    0     mins 59319      Un-Timed:  Electrical Stimulation:    0     mins  43975 ( );  Dry Needling     0     mins self-pay  Traction     0     mins 31305  Low Eval     20     Mins  01754  Mod Eval     0     Mins  65864  High Eval                       0     Mins  28439  Re-Eval                           0    mins  66809    Timed Treatment:   55   mins   Total Treatment:     75   mins    PT SIGNATURE: Samson Orellana, PT    Electronically signed 7/11/2025    [place] License: PT - [id number]      Initial Certification  Certification Period: 7/11/2025 thru 10/8/2025  I certify that the therapy services are furnished while this patient is under my care.  The services outlined above are required by this patient, and will be reviewed every 90 days.     PHYSICIAN: Washington Bishop MD  NPI: [id number]      DATE:     Please sign and return via fax to [phone]. Thank you, [facility].

## 2025-07-14 ENCOUNTER — TELEPHONE (OUTPATIENT)
Dept: ORTHOPEDIC SURGERY | Facility: CLINIC | Age: 65
End: 2025-07-14
Payer: OTHER GOVERNMENT

## 2025-07-14 DIAGNOSIS — M17.11 PRIMARY LOCALIZED OSTEOARTHRITIS OF RIGHT KNEE: ICD-10-CM

## 2025-07-14 RX ORDER — HYDROCODONE BITARTRATE AND ACETAMINOPHEN 7.5; 325 MG/1; MG/1
1 TABLET ORAL EVERY 4 HOURS PRN
Qty: 42 TABLET | Refills: 0 | Status: SHIPPED | OUTPATIENT
Start: 2025-07-14

## 2025-07-16 ENCOUNTER — TREATMENT (OUTPATIENT)
Dept: PHYSICAL THERAPY | Facility: CLINIC | Age: 65
End: 2025-07-16
Payer: OTHER GOVERNMENT

## 2025-07-16 DIAGNOSIS — R26.2 DIFFICULTY WALKING: ICD-10-CM

## 2025-07-16 DIAGNOSIS — Z96.651 S/P TOTAL KNEE ARTHROPLASTY, RIGHT: Primary | ICD-10-CM

## 2025-07-16 DIAGNOSIS — M25.561 ACUTE POSTOPERATIVE PAIN OF RIGHT KNEE: ICD-10-CM

## 2025-07-16 DIAGNOSIS — G89.18 ACUTE POSTOPERATIVE PAIN OF RIGHT KNEE: ICD-10-CM

## 2025-07-16 PROCEDURE — 97140 MANUAL THERAPY 1/> REGIONS: CPT

## 2025-07-16 NOTE — PROGRESS NOTES
[facility]                              Physical Therapy Daily Treatment Note    Patient: Jay Muñiz   : 1960  Diagnosis/ICD-10 Code:  S/P total knee arthroplasty, right [Z96.651]  Referring practitioner: Washington Bishop MD  Date of Initial Visit: Type: THERAPY  Noted: 2025  Today's Date: 2025  Patient seen for 2 sessions           Subjective   Jay Muñiz reports: his knee is hurting some today. Most of his complaints is the pain in his upper thigh where the tourniquet was. Pt states he does his exercises at home but he was hurting pretty bad the past 2 days so he didn't do as much. Pt did take his rx pain medication prior to PT.         Objective   Patient has DANITZA hose donned, no warmth or pain with palpation to calf.    See Exercise, Manual, and Modality Logs for complete treatment.     Assessment/Plan  Today is patient's first treatment session following initial eval to address outlined deficits listed. Pt felt much relief with STM to his quadriceps and was able to complete full revolutions on rec bike. Patient will continue to benefit from skilled physical therapy services to further address pain management,  their deficits in strength, and range of motion in order to improve upon their functional mobility for any ADL concerns.      Progress per Plan of Care         Timed:  Manual Therapy:    23     mins  32576;  Therapeutic Exercise:    5     mins  96280;     Neuromuscular Aleshia:        mins  25583;    Therapeutic Activity:          mins  58755;     Gait Training:           mins  94500;        Untimed:  Electrical Stimulation:         mins  44031 ( );  Mechanical Traction:         mins  85690;       Timed Treatment:   28   mins   Total Treatment:     28   mins      Electronically signed:     Shobha Boss PTA  Physical Therapist Assistant  [facility] [place] License #: [id number]

## 2025-07-18 ENCOUNTER — TREATMENT (OUTPATIENT)
Dept: PHYSICAL THERAPY | Facility: CLINIC | Age: 65
End: 2025-07-18
Payer: OTHER GOVERNMENT

## 2025-07-18 DIAGNOSIS — G89.18 ACUTE POSTOPERATIVE PAIN OF RIGHT KNEE: ICD-10-CM

## 2025-07-18 DIAGNOSIS — R26.2 DIFFICULTY WALKING: ICD-10-CM

## 2025-07-18 DIAGNOSIS — M25.561 ACUTE POSTOPERATIVE PAIN OF RIGHT KNEE: ICD-10-CM

## 2025-07-18 DIAGNOSIS — Z96.651 S/P TOTAL KNEE ARTHROPLASTY, RIGHT: Primary | ICD-10-CM

## 2025-07-18 NOTE — PROGRESS NOTES
Outpatient Physical Therapy                   Physical Therapy Daily Treatment Note    Patient: Jay Muñiz   : 1960  Diagnosis/ICD-10 Code:  S/P total knee arthroplasty, right [Z96.651]  Referring practitioner: Washington Bishop MD  Date of Initial Visit: Type: THERAPY  Noted: 2025  Today's Date: 2025  Patient seen for 3 sessions             Subjective   Jay Muñiz reports: 4/10 pain, at time of arrival with pain medication.     Objective     See Exercise, Manual, and Modality Logs for complete treatment.     Assessment/Plan  Patient is progressing well with active movement and PROM. Patient was able to progress to new exercises today without complaints. Education provided to patient and his wife during the treatment.     Progress per Plan of Care      Timed:  Manual Therapy:    12     mins  09149;  Therapeutic Exercise:    10     mins  07015;  Neuromuscular Aleshia:    0    mins  42956;  Therapeutic Activity:     8     mins  87073;  Self care:                       0     mins  02844;  Gait Trainin     mins  01627;  Ultrasound:                    0     mins  09413;    Untimed:  Mechanical Traction:    0     mins  55467;  Electrical Stimulation:    0     mins  21924 ( );      Timed Treatment:   30   mins  Total Treatment:     33   mins      Electronically signed:   Jamila Sweet PTA  Physical Therapist Assistant  [facility] License #: [id number]  
show

## 2025-07-22 ENCOUNTER — TELEPHONE (OUTPATIENT)
Dept: ORTHOPEDIC SURGERY | Facility: CLINIC | Age: 65
End: 2025-07-22
Payer: OTHER GOVERNMENT

## 2025-07-22 DIAGNOSIS — M17.11 PRIMARY LOCALIZED OSTEOARTHRITIS OF RIGHT KNEE: ICD-10-CM

## 2025-07-22 LAB
QT INTERVAL: 421 MS
QTC INTERVAL: 374 MS

## 2025-07-22 RX ORDER — HYDROCODONE BITARTRATE AND ACETAMINOPHEN 7.5; 325 MG/1; MG/1
1 TABLET ORAL EVERY 4 HOURS PRN
Qty: 42 TABLET | Refills: 0 | Status: SHIPPED | OUTPATIENT
Start: 2025-07-22

## 2025-07-23 ENCOUNTER — TREATMENT (OUTPATIENT)
Dept: PHYSICAL THERAPY | Facility: CLINIC | Age: 65
End: 2025-07-23
Payer: OTHER GOVERNMENT

## 2025-07-23 DIAGNOSIS — G89.18 ACUTE POSTOPERATIVE PAIN OF RIGHT KNEE: ICD-10-CM

## 2025-07-23 DIAGNOSIS — M25.561 ACUTE POSTOPERATIVE PAIN OF RIGHT KNEE: ICD-10-CM

## 2025-07-23 DIAGNOSIS — Z96.651 S/P TOTAL KNEE ARTHROPLASTY, RIGHT: Primary | ICD-10-CM

## 2025-07-23 DIAGNOSIS — R26.2 DIFFICULTY WALKING: ICD-10-CM

## 2025-07-23 NOTE — PROGRESS NOTES
Physical Therapy Daily Treatment Note                          Patient: Jay Muñiz   : 1960  Diagnosis/ICD-10 Code:  S/P total knee arthroplasty, right [Z96.651]  Referring practitioner: Washington Bishop MD  Date of Initial Visit: Type: THERAPY  Noted: 2025  Today's Date: 2025  Patient seen for 4 sessions           Subjective   The patient reported overall improvement.     Objective   See Exercise, Manual, and Modality Logs for complete treatment.     Assessment/Plan     Patient tolerated today's treatment without complaints of pain. Improvements noted in ROM following ADLs. Able to progress resistance and overall activity challenge in txt today. Strength, ROM, and increased pain with activity deficits still limits patient's ability to perform ADLs. Further skilled care indicated at this time.       Timed:  Manual Therapy:    12     mins  10578;  Therapeutic Exercise:    21     mins  21686;     Neuromuscular Aleshia:   0    mins  11072;    Therapeutic Activity:     10     mins  49051;     Gait Trainin     mins  69855;     Aquatics                         0      mins  34370    Un-timed:  Mechanical Traction      0     mins  73756  Dry Needling     0     mins self-pay  Electrical Stimulation:    0     mins  86802 ( );      Timed Treatment:   43   mins   Total Treatment:     43   mins    Samson Orellana PT  Physical Therapist    Electronically signed 2025    [place] License: PT - [id number]

## 2025-07-24 ENCOUNTER — OFFICE VISIT (OUTPATIENT)
Dept: ORTHOPEDIC SURGERY | Facility: CLINIC | Age: 65
End: 2025-07-24
Payer: OTHER GOVERNMENT

## 2025-07-24 VITALS — WEIGHT: 194 LBS | HEIGHT: 69 IN | BODY MASS INDEX: 28.73 KG/M2

## 2025-07-24 DIAGNOSIS — M25.561 RIGHT KNEE PAIN, UNSPECIFIED CHRONICITY: ICD-10-CM

## 2025-07-24 DIAGNOSIS — Z47.89 AFTERCARE FOLLOWING SURGERY OF THE MUSCULOSKELETAL SYSTEM: Primary | ICD-10-CM

## 2025-07-24 PROCEDURE — 99024 POSTOP FOLLOW-UP VISIT: CPT | Performed by: PHYSICIAN ASSISTANT

## 2025-07-24 NOTE — PROGRESS NOTES
"Chief Complaint  Follow-up of the Right Knee and Suture / Staple Removal    Subjective          History of Present Illness      Jay Muñiz is a 64 y.o. male  presents to [facility] for     Patient presents with his wife Peri for a 2-week postoperative evaluation of right total knee arthroplasty 2025.  Patient recently got a pain medicine refill he is also compliant with DVT prophylaxis and DANITZA hose.  They state the incision has been healing well and denies redness drainage or dehiscence he also denies calf pain.  He has been attending therapy at the [facility] office he goes twice a week.  He presents using a cane today.      No Known Allergies     Social History     Socioeconomic History    Marital status:    Tobacco Use    Smoking status: Former     Current packs/day: 0.00     Average packs/day: 1.5 packs/day for 30.0 years (45.0 ttl pk-yrs)     Types: Cigarettes     Start date: 1976     Quit date: 2006     Years since quittin.7    Smokeless tobacco: Never   Vaping Use    Vaping status: Never Used   Substance and Sexual Activity    Alcohol use: Not Currently     Comment: Social Drinker, haven't drank in years    Drug use: Never    Sexual activity: Not Currently     Partners: Female        REVIEW OF SYSTEMS    Constitutional: Awake alert and oriented x3, no acute distress, denies fevers, chills, weight loss  Respiratory: No respiratory distress  Vascular: Brisk cap refill, Intact distal pulses, No cyanosis, compartments soft with no signs or symptoms of compartment syndrome or DVT.   Cardiovascular: Denies chest pain, shortness of breath  Skin: Denies rashes, acute skin changes  Neurologic: Denies headache, loss of consciousness  MSK: Right knee pain      Objective   Vital Signs:   Ht 175.3 cm (69.02\")   Wt 88 kg (194 lb)   BMI 28.64 kg/m²     Body mass index is 28.64 kg/m².    Physical Exam       Right knee: Incisions are healing well no erythema, no " drainage or dehiscence, no signs of infection, extension -5 flexion 95, nontender calf, negative Rima testing, patient able to hold straight leg raise knee extensor mechanism intact patient ambulates with antalgic gait using a cane      Procedures    Imaging Results (Most Recent)       Procedure Component Value Units Date/Time    XR Knee 3 View Right - Preliminary [[id number]] Resulted: 07/24/25 0951     Updated: 07/24/25 0951    This result has not been signed. Information might be incomplete.      Narrative:      View:AP/Lateral and Verdi view(s)  Site: Right knee  Indication: Right knee pain  Study: X-rays ordered, taken in the office, and reviewed today  X-ray: Intact appearing right total knee arthroplasty, no signs of   hardware failure or loosening, no subsidence or periprosthetic fracture,   good alignment  Comparative data: Previous studies                   Assessment and Plan    Diagnoses and all orders for this visit:    1. Aftercare following surgery of RIGHT TOTAL KNEE ARTHROPLASTY 7/9/25 (Primary)    2. Right knee pain, unspecified chronicity  -     XR Knee 3 View Right        Reviewed x-rays with the patient, discussed diagnosis and treatment options with him and his wife they were advised patient should continue therapy for strength and range of motion, continue finish DVT prophylaxis, he was advised to continue pain medicine as needed he will call for refills.  Sutures/staples removed in office today. Steri-strips applied. Discussed incision care/hygiene. May shower, but do not submerge in water until fully healed.  Advised patient that if any concerning symptoms regarding incision appearance occur that they should call us right away, patient expressed understanding.  Follow-up 4 weeks for recheck    Call or return if worsening symptoms.    Follow Up   Return in about 4 weeks (around 8/21/2025) for Recheck.  Patient was given instructions and counseling regarding his condition or for health  maintenance advice. Please see specific information pulled into the AVS if appropriate.       EMR Dragon/Transcription disclaimer:  Part of this note may be an electronic transcription/translation of spoken language to printed text using the Dragon Dictation System

## 2025-07-25 ENCOUNTER — TREATMENT (OUTPATIENT)
Dept: PHYSICAL THERAPY | Facility: CLINIC | Age: 65
End: 2025-07-25
Payer: OTHER GOVERNMENT

## 2025-07-25 DIAGNOSIS — Z96.651 S/P TOTAL KNEE ARTHROPLASTY, RIGHT: Primary | ICD-10-CM

## 2025-07-25 DIAGNOSIS — M25.561 ACUTE POSTOPERATIVE PAIN OF RIGHT KNEE: ICD-10-CM

## 2025-07-25 DIAGNOSIS — G89.18 ACUTE POSTOPERATIVE PAIN OF RIGHT KNEE: ICD-10-CM

## 2025-07-25 DIAGNOSIS — R26.2 DIFFICULTY WALKING: ICD-10-CM

## 2025-07-25 PROCEDURE — 97110 THERAPEUTIC EXERCISES: CPT

## 2025-07-25 PROCEDURE — 97140 MANUAL THERAPY 1/> REGIONS: CPT

## 2025-07-25 NOTE — PROGRESS NOTES
[facility]                              Physical Therapy Daily Treatment Note    Patient: Jay Muñiz   : 1960  Diagnosis/ICD-10 Code:  S/P total knee arthroplasty, right [Z96.651]  Referring practitioner: Washington Bishop MD  Date of Initial Visit: Type: THERAPY  Noted: 2025  Today's Date: 2025  Patient seen for 5 sessions           Subjective   Jay Muñiz reports: he is still sore from his last visit. Pt states his discomfort is mainly in his quads.       Objective     See Exercise, Manual, and Modality Logs for complete treatment.     Assessment/Plan  Jay progressing as evident by decreased overall right knee pain this session.  Pt tolerated exercises and manual well, with no complaints of increased pain or discomfort. Patient will continue to benefit from skilled physical therapy services to further address pain management,  their deficits in strength, and range of motion in order to improve upon their functional mobility for any ADL concerns.      Progress per Plan of Care         Timed:  Manual Therapy:    10     mins  97477;  Therapeutic Exercise:    18     mins  76992;     Neuromuscular Aleshia:        mins  22586;    Therapeutic Activity:          mins  67239;     Gait Training:           mins  90082;        Untimed:  Electrical Stimulation:         mins  60795 ( );  Mechanical Traction:         mins  58275;       Timed Treatment:   28   mins   Total Treatment:     28   mins      Electronically signed:     Shobha Boss PTA  Physical Therapist Assistant  [facility] License #: [id number]

## 2025-07-29 ENCOUNTER — TREATMENT (OUTPATIENT)
Dept: PHYSICAL THERAPY | Facility: CLINIC | Age: 65
End: 2025-07-29
Payer: OTHER GOVERNMENT

## 2025-07-29 DIAGNOSIS — M25.561 ACUTE POSTOPERATIVE PAIN OF RIGHT KNEE: ICD-10-CM

## 2025-07-29 DIAGNOSIS — G89.18 ACUTE POSTOPERATIVE PAIN OF RIGHT KNEE: ICD-10-CM

## 2025-07-29 DIAGNOSIS — R26.2 DIFFICULTY WALKING: ICD-10-CM

## 2025-07-29 DIAGNOSIS — Z96.651 S/P TOTAL KNEE ARTHROPLASTY, RIGHT: Primary | ICD-10-CM

## 2025-07-29 PROCEDURE — 97110 THERAPEUTIC EXERCISES: CPT

## 2025-07-29 PROCEDURE — 97140 MANUAL THERAPY 1/> REGIONS: CPT

## 2025-07-29 NOTE — PROGRESS NOTES
Outpatient Physical Therapy                   Physical Therapy Daily Treatment Note    Patient: Jay Muñiz   : 1960  Diagnosis/ICD-10 Code:  S/P total knee arthroplasty, right [Z96.651]  Referring practitioner: Washington Bishop MD  Date of Initial Visit: Type: THERAPY  Noted: 2025  Today's Date: 2025  Patient seen for 6 sessions             Subjective   Jay Muñiz reports: the tightness in his R anterior thigh just wont go away. Pt states he has pain when sitting due to the pressure of the edge of the seat on his hamstrings. Pt states he was very sore from last session.     Pain: 2-3/10 pain, in the right knee.     Objective     See Exercise, Manual, and Modality Logs for complete treatment.     Assessment/Plan  Pt is progressing well with PROM. Pt had pain with prone knee flexion stretches as the quad was very tight; light to moderate pressure was applied. Bandar showed improvements with sit to stand form; his wife noticed this as well. Pt would benefit from skilled PT to address Range of Motion  and Strength deficits, pain management and any concerns with ADLs.       Progress per Plan of Care      Timed:  Manual Therapy:    13     mins  23223;  Therapeutic Exercise:    8     mins  65615;  Neuromuscular Aleshia:    0    mins  14600;  Therapeutic Activity:     8     mins  95542;  Self care:                       0     mins  71263;  Gait Trainin     mins  32042;  Ultrasound:                    0     mins  42791;    Untimed:  Mechanical Traction:    0     mins  95959;  Electrical Stimulation:    0     mins  94881 ( );      Timed Treatment:   29   mins  Total Treatment:     29   mins      Electronically signed:   Jamila Sweet PTA  Physical Therapist Assistant  [facility] License #: [id number]

## 2025-07-31 ENCOUNTER — TREATMENT (OUTPATIENT)
Dept: PHYSICAL THERAPY | Facility: CLINIC | Age: 65
End: 2025-07-31
Payer: OTHER GOVERNMENT

## 2025-07-31 DIAGNOSIS — R26.2 DIFFICULTY WALKING: ICD-10-CM

## 2025-07-31 DIAGNOSIS — M25.561 ACUTE POSTOPERATIVE PAIN OF RIGHT KNEE: ICD-10-CM

## 2025-07-31 DIAGNOSIS — G89.18 ACUTE POSTOPERATIVE PAIN OF RIGHT KNEE: ICD-10-CM

## 2025-07-31 DIAGNOSIS — Z96.651 S/P TOTAL KNEE ARTHROPLASTY, RIGHT: Primary | ICD-10-CM

## 2025-07-31 NOTE — PROGRESS NOTES
Outpatient Physical Therapy                   Physical Therapy Daily Treatment Note    Patient: Jay Muñiz   : 1960  Diagnosis/ICD-10 Code:  S/P total knee arthroplasty, right [Z96.651]  Referring practitioner: Washington Bishop MD  Date of Initial Visit: Type: THERAPY  Noted: 2025  Today's Date: 2025  Patient seen for 7 sessions             Subjective   Jay Muñiz reports: he only took one pain pill today to see how he does.     Pain: 3-4/10 pain, at time of arrival.     Objective     See Exercise, Manual, and Modality Logs for complete treatment.     Assessment/Plan  Pt is progressing well with PROM. Pt tolerated all strengthening without complaints. Pt would benefit from skilled PT to address Range of Motion  and Strength deficits, pain management and any concerns with ADLs.       Progress per Plan of Care      Timed:  Manual Therapy:    10     mins  47777;  Therapeutic Exercise:    10     mins  87315;  Neuromuscular Aleshia:    0    mins  23604;  Therapeutic Activity:     10     mins  48795;  Self care:                       0     mins  76030;  Gait Trainin     mins  42426;  Ultrasound:                    0     mins  28823;    Untimed:  Mechanical Traction:    0     mins  70355;  Electrical Stimulation:    0     mins  40559 ( );      Timed Treatment:   30   mins  Total Treatment:     30   mins      Electronically signed:   Jamila Sweet PTA  Physical Therapist Assistant  [facility] License #: [id number]

## 2025-08-01 ENCOUNTER — TELEPHONE (OUTPATIENT)
Dept: ORTHOPEDIC SURGERY | Facility: CLINIC | Age: 65
End: 2025-08-01
Payer: OTHER GOVERNMENT

## 2025-08-01 DIAGNOSIS — M17.11 PRIMARY LOCALIZED OSTEOARTHRITIS OF RIGHT KNEE: ICD-10-CM

## 2025-08-01 RX ORDER — HYDROCODONE BITARTRATE AND ACETAMINOPHEN 7.5; 325 MG/1; MG/1
1 TABLET ORAL EVERY 4 HOURS PRN
Qty: 42 TABLET | Refills: 0 | Status: SHIPPED | OUTPATIENT
Start: 2025-08-01

## 2025-08-05 ENCOUNTER — TREATMENT (OUTPATIENT)
Dept: PHYSICAL THERAPY | Facility: CLINIC | Age: 65
End: 2025-08-05
Payer: OTHER GOVERNMENT

## 2025-08-05 DIAGNOSIS — Z96.651 S/P TOTAL KNEE ARTHROPLASTY, RIGHT: Primary | ICD-10-CM

## 2025-08-05 DIAGNOSIS — G89.18 ACUTE POSTOPERATIVE PAIN OF RIGHT KNEE: ICD-10-CM

## 2025-08-05 DIAGNOSIS — R26.2 DIFFICULTY WALKING: ICD-10-CM

## 2025-08-05 DIAGNOSIS — M25.561 ACUTE POSTOPERATIVE PAIN OF RIGHT KNEE: ICD-10-CM

## 2025-08-05 PROCEDURE — 97530 THERAPEUTIC ACTIVITIES: CPT | Performed by: PHYSICAL THERAPIST

## 2025-08-05 PROCEDURE — 97140 MANUAL THERAPY 1/> REGIONS: CPT | Performed by: PHYSICAL THERAPIST

## 2025-08-05 PROCEDURE — 97110 THERAPEUTIC EXERCISES: CPT | Performed by: PHYSICAL THERAPIST

## 2025-08-08 ENCOUNTER — TREATMENT (OUTPATIENT)
Dept: PHYSICAL THERAPY | Facility: CLINIC | Age: 65
End: 2025-08-08
Payer: OTHER GOVERNMENT

## 2025-08-08 DIAGNOSIS — Z96.651 S/P TOTAL KNEE ARTHROPLASTY, RIGHT: Primary | ICD-10-CM

## 2025-08-08 DIAGNOSIS — G89.18 ACUTE POSTOPERATIVE PAIN OF RIGHT KNEE: ICD-10-CM

## 2025-08-08 DIAGNOSIS — R26.2 DIFFICULTY WALKING: ICD-10-CM

## 2025-08-08 DIAGNOSIS — M25.561 ACUTE POSTOPERATIVE PAIN OF RIGHT KNEE: ICD-10-CM

## 2025-08-11 ENCOUNTER — TREATMENT (OUTPATIENT)
Dept: PHYSICAL THERAPY | Facility: CLINIC | Age: 65
End: 2025-08-11
Payer: OTHER GOVERNMENT

## 2025-08-11 DIAGNOSIS — Z96.651 S/P TOTAL KNEE ARTHROPLASTY, RIGHT: Primary | ICD-10-CM

## 2025-08-11 DIAGNOSIS — R26.2 DIFFICULTY WALKING: ICD-10-CM

## 2025-08-11 DIAGNOSIS — G89.18 ACUTE POSTOPERATIVE PAIN OF RIGHT KNEE: ICD-10-CM

## 2025-08-11 DIAGNOSIS — M25.561 ACUTE POSTOPERATIVE PAIN OF RIGHT KNEE: ICD-10-CM

## 2025-08-11 PROCEDURE — 97140 MANUAL THERAPY 1/> REGIONS: CPT

## 2025-08-11 PROCEDURE — 97164 PT RE-EVAL EST PLAN CARE: CPT

## 2025-08-11 PROCEDURE — 97110 THERAPEUTIC EXERCISES: CPT

## 2025-08-15 ENCOUNTER — TREATMENT (OUTPATIENT)
Dept: PHYSICAL THERAPY | Facility: CLINIC | Age: 65
End: 2025-08-15
Payer: OTHER GOVERNMENT

## 2025-08-15 DIAGNOSIS — G89.18 ACUTE POSTOPERATIVE PAIN OF RIGHT KNEE: ICD-10-CM

## 2025-08-15 DIAGNOSIS — M25.561 ACUTE POSTOPERATIVE PAIN OF RIGHT KNEE: ICD-10-CM

## 2025-08-15 DIAGNOSIS — R26.2 DIFFICULTY WALKING: ICD-10-CM

## 2025-08-15 DIAGNOSIS — Z96.651 S/P TOTAL KNEE ARTHROPLASTY, RIGHT: Primary | ICD-10-CM

## 2025-08-20 ENCOUNTER — TREATMENT (OUTPATIENT)
Dept: PHYSICAL THERAPY | Facility: CLINIC | Age: 65
End: 2025-08-20
Payer: OTHER GOVERNMENT

## 2025-08-20 DIAGNOSIS — R26.2 DIFFICULTY WALKING: ICD-10-CM

## 2025-08-20 DIAGNOSIS — G89.18 ACUTE POSTOPERATIVE PAIN OF RIGHT KNEE: ICD-10-CM

## 2025-08-20 DIAGNOSIS — Z96.651 S/P TOTAL KNEE ARTHROPLASTY, RIGHT: Primary | ICD-10-CM

## 2025-08-20 DIAGNOSIS — M25.561 ACUTE POSTOPERATIVE PAIN OF RIGHT KNEE: ICD-10-CM

## 2025-08-21 ENCOUNTER — OFFICE VISIT (OUTPATIENT)
Dept: ORTHOPEDIC SURGERY | Facility: CLINIC | Age: 65
End: 2025-08-21
Payer: OTHER GOVERNMENT

## 2025-08-21 VITALS — HEART RATE: 58 BPM | HEIGHT: 69 IN | BODY MASS INDEX: 28.14 KG/M2 | OXYGEN SATURATION: 98 % | WEIGHT: 190 LBS

## 2025-08-21 DIAGNOSIS — Z47.89 AFTERCARE FOLLOWING SURGERY OF THE MUSCULOSKELETAL SYSTEM: Primary | ICD-10-CM

## 2025-08-21 PROCEDURE — 99024 POSTOP FOLLOW-UP VISIT: CPT | Performed by: PHYSICIAN ASSISTANT

## 2025-08-22 ENCOUNTER — TREATMENT (OUTPATIENT)
Dept: PHYSICAL THERAPY | Facility: CLINIC | Age: 65
End: 2025-08-22
Payer: OTHER GOVERNMENT

## 2025-08-22 DIAGNOSIS — R26.2 DIFFICULTY WALKING: ICD-10-CM

## 2025-08-22 DIAGNOSIS — G89.18 ACUTE POSTOPERATIVE PAIN OF RIGHT KNEE: ICD-10-CM

## 2025-08-22 DIAGNOSIS — Z96.651 S/P TOTAL KNEE ARTHROPLASTY, RIGHT: Primary | ICD-10-CM

## 2025-08-22 DIAGNOSIS — M25.561 ACUTE POSTOPERATIVE PAIN OF RIGHT KNEE: ICD-10-CM

## 2025-08-22 PROCEDURE — 97530 THERAPEUTIC ACTIVITIES: CPT

## 2025-08-22 PROCEDURE — 97110 THERAPEUTIC EXERCISES: CPT

## 2025-08-28 ENCOUNTER — TELEPHONE (OUTPATIENT)
Dept: ORTHOPEDIC SURGERY | Facility: CLINIC | Age: 65
End: 2025-08-28
Payer: OTHER GOVERNMENT

## 2025-08-28 DIAGNOSIS — M17.11 PRIMARY LOCALIZED OSTEOARTHRITIS OF RIGHT KNEE: ICD-10-CM

## 2025-08-28 RX ORDER — HYDROCODONE BITARTRATE AND ACETAMINOPHEN 7.5; 325 MG/1; MG/1
1 TABLET ORAL EVERY 4 HOURS PRN
Qty: 42 TABLET | Refills: 0 | Status: SHIPPED | OUTPATIENT
Start: 2025-08-28

## 2025-08-29 ENCOUNTER — TREATMENT (OUTPATIENT)
Dept: PHYSICAL THERAPY | Facility: CLINIC | Age: 65
End: 2025-08-29
Payer: OTHER GOVERNMENT

## 2025-08-29 DIAGNOSIS — M25.561 ACUTE POSTOPERATIVE PAIN OF RIGHT KNEE: ICD-10-CM

## 2025-08-29 DIAGNOSIS — Z96.651 S/P TOTAL KNEE ARTHROPLASTY, RIGHT: Primary | ICD-10-CM

## 2025-08-29 DIAGNOSIS — R26.2 DIFFICULTY WALKING: ICD-10-CM

## 2025-08-29 DIAGNOSIS — G89.18 ACUTE POSTOPERATIVE PAIN OF RIGHT KNEE: ICD-10-CM

## 2025-08-29 PROCEDURE — 97140 MANUAL THERAPY 1/> REGIONS: CPT

## 2025-08-29 PROCEDURE — 97110 THERAPEUTIC EXERCISES: CPT

## (undated) DEVICE — STERILE POLYISOPRENE POWDER-FREE SURGICAL GLOVES: Brand: PROTEXIS

## (undated) DEVICE — BNDG ELAS MATRX V/CLS 6INX10YD LF

## (undated) DEVICE — SUT ETHLN 3-0 FS118IN 663H

## (undated) DEVICE — SLV SCD KN/LEN ADJ EXPRSS BLENDED MD 1P/U

## (undated) DEVICE — GLV SURG SENSICARE PI ORTHO SZ8 LF STRL

## (undated) DEVICE — SUT ETHLN 3/0 PS1 18IN 1663H

## (undated) DEVICE — PROXIMATE RH ROTATING HEAD SKIN STAPLERS (35 WIDE) CONTAINS 35 STAINLESS STEEL STAPLES: Brand: PROXIMATE

## (undated) DEVICE — BASIC SINGLE BASIN-LF: Brand: MEDLINE INDUSTRIES, INC.

## (undated) DEVICE — DRSNG GZ PETROLTM XEROFORM CURAD 1X8IN STRL

## (undated) DEVICE — TOWEL,OR,DSP,ST,BLUE,STD,4/PK,20PK/CS: Brand: MEDLINE

## (undated) DEVICE — SOL IRR NACL 0.9PCT 3000ML

## (undated) DEVICE — INTEGRATED CASSETTE TUBING, DO NOT USE IF PACKAGE IS DAMAGED: Brand: CROSSFLOW

## (undated) DEVICE — KNEE ARTHROSCOPY-LF: Brand: MEDLINE INDUSTRIES, INC.

## (undated) DEVICE — APPL CHLORAPREP HI/LITE 26ML ORNG

## (undated) DEVICE — DRP SURG U/DRP INVISISHIELD PA 48X52IN

## (undated) DEVICE — DRSNG PAD ABD 8X10IN STRL

## (undated) DEVICE — PULLOVER TOGA, 2X LARGE: Brand: FLYTE, SURGICOOL

## (undated) DEVICE — UNDERCAST PADDING: Brand: DEROYAL

## (undated) DEVICE — TOTAL KNEE-LF: Brand: MEDLINE INDUSTRIES, INC.

## (undated) DEVICE — BNDG ESMARK STRL 6INX12FT LF

## (undated) DEVICE — BNDG ELAS ECON W/CLIP 6IN 5YD LF STRL

## (undated) DEVICE — SYR LUERLOK 30CC

## (undated) DEVICE — MAT FLR ABS W/BLU/LINER 56X72IN WHT

## (undated) DEVICE — STERILE POLYISOPRENE POWDER-FREE SURGICAL GLOVES WITH EMOLLIENT COATING: Brand: PROTEXIS

## (undated) DEVICE — DISPOSABLE TOURNIQUET CUFF 30"X4", 1-LINE, WHITE, STERILE, 1EA/PK, 10PK/CS: Brand: ASP MEDICAL

## (undated) DEVICE — INTENDED FOR TISSUE SEPARATION, AND OTHER PROCEDURES THAT REQUIRE A SHARP SURGICAL BLADE TO PUNCTURE OR CUT.: Brand: BARD-PARKER ® CARBON RIB-BACK BLADES

## (undated) DEVICE — DRP ROBOTIC ROSA BX/20

## (undated) DEVICE — THE STERILE LIGHT HANDLE COVER IS USED WITH STERIS SURGICAL LIGHTING AND VISUALIZATION SYSTEMS.

## (undated) DEVICE — PENCL SMOKE/EVAC MEGADYNE TELESCP 10FT

## (undated) DEVICE — GLV SURG SENSICARE SLT PF LF 7.5 STRL

## (undated) DEVICE — BLD CUT FORMLA AGGR PLS 5.0MM

## (undated) DEVICE — 3 BONE CEMENT MIXER: Brand: MIXEVAC

## (undated) DEVICE — INTENDED TO SUPPORT AND MAINTAIN THE POSITION OF AN ANESTHETIZED PATIENT DURING SURGERY: Brand: HERMANTOR XL PINK KNEE POSITIONING PAD

## (undated) DEVICE — SUT VIC 2/0 CT1 36IN

## (undated) DEVICE — CVR LEG BOOTLEG F/R NOSKID 33IN

## (undated) DEVICE — HANDPIECE SET WITH HIGH FLOW TIP AND SUCTION TUBE: Brand: INTERPULSE

## (undated) DEVICE — DRSNG SURESITE WNDW 4X4.5

## (undated) DEVICE — PEEL-AWAY TOGA, 2X LARGE: Brand: FLYTE

## (undated) DEVICE — STRYKER PERFORMANCE SERIES SAGITTAL BLADE: Brand: STRYKER PERFORMANCE SERIES

## (undated) DEVICE — DISPOSABLE TOURNIQUET CUFF SINGLE BLADDER, SINGLE PORT AND QUICK CONNECT CONNECTOR: Brand: COLOR CUFF

## (undated) DEVICE — NEEDLE,18GX1.5",REG,BEVEL: Brand: MEDLINE

## (undated) DEVICE — SHEET,DRAPE,70X85,STERILE: Brand: MEDLINE

## (undated) DEVICE — DRESSING,GAUZE,XEROFORM,CURAD,1"X8",ST: Brand: CURAD

## (undated) DEVICE — GAUZE,SPONGE,4"X4",16PLY,STRL,LF,10/TRAY: Brand: MEDLINE

## (undated) DEVICE — SUT ETHLN 3/0 FS1 663G

## (undated) DEVICE — ANTIBACTERIAL VIOLET BRAIDED (POLYGLACTIN 910), SYNTHETIC ABSORBABLE SURGICAL SUTURE: Brand: COATED VICRYL